# Patient Record
Sex: FEMALE | Race: BLACK OR AFRICAN AMERICAN | NOT HISPANIC OR LATINO | URBAN - METROPOLITAN AREA
[De-identification: names, ages, dates, MRNs, and addresses within clinical notes are randomized per-mention and may not be internally consistent; named-entity substitution may affect disease eponyms.]

---

## 2023-06-29 ENCOUNTER — APPOINTMENT (OUTPATIENT)
Dept: LAB | Facility: LAB | Age: 23
End: 2023-06-29
Payer: MEDICAID

## 2023-06-29 LAB
ABO GROUP (TYPE) IN BLOOD: NORMAL
ANTIBODY SCREEN: NORMAL
CHLAMYDIA TRACH., AMPLIFIED: NEGATIVE
CHLAMYDIA TRACH., AMPLIFIED: NORMAL
CLUE CELLS: NORMAL
ERYTHROCYTE DISTRIBUTION WIDTH (RATIO) BY AUTOMATED COUNT: 12.5 % (ref 11.5–14.5)
ERYTHROCYTE MEAN CORPUSCULAR HEMOGLOBIN CONCENTRATION (G/DL) BY AUTOMATED: 33.2 G/DL (ref 32–36)
ERYTHROCYTE MEAN CORPUSCULAR VOLUME (FL) BY AUTOMATED COUNT: 90 FL (ref 80–100)
ERYTHROCYTES (10*6/UL) IN BLOOD BY AUTOMATED COUNT: 4.63 X10E12/L (ref 4–5.2)
HEMATOCRIT (%) IN BLOOD BY AUTOMATED COUNT: 41.6 % (ref 36–46)
HEMOGLOBIN (G/DL) IN BLOOD: 13.8 G/DL (ref 12–16)
HEPATITIS B VIRUS SURFACE AG PRESENCE IN SERUM: NONREACTIVE
HEPATITIS C VIRUS AB PRESENCE IN SERUM: NONREACTIVE
HIV 1/ 2 AG/AB SCREEN: NONREACTIVE
LEUKOCYTES (10*3/UL) IN BLOOD BY AUTOMATED COUNT: 4.7 X10E9/L (ref 4.4–11.3)
N. GONORRHEA, AMPLIFIED: NEGATIVE
N. GONORRHEA, AMPLIFIED: NORMAL
NRBC (PER 100 WBCS) BY AUTOMATED COUNT: 0 /100 WBC (ref 0–0)
NUGENT SCORE: NORMAL
PLATELETS (10*3/UL) IN BLOOD AUTOMATED COUNT: 247 X10E9/L (ref 150–450)
REFLEX ADDED, ANEMIA PANEL: NORMAL
RH FACTOR: NORMAL
RUBELLA VIRUS IGG AB: POSITIVE
SYPHILIS TOTAL AB: NONREACTIVE
TRICHOMONAS VAGINALIS: NEGATIVE
TRICHOMONAS VAGINALIS: NORMAL
URINE CULTURE: NORMAL
YEAST: NORMAL

## 2023-06-30 LAB
CLUE CELLS: NORMAL
HEMOGLOBIN A2: 2.9 %
HEMOGLOBIN A: 96.6 %
HEMOGLOBIN F: 0.5 %
HEMOGLOBIN IDENTIFICATION INTERPRETATION: NORMAL
NUGENT SCORE: 0
PATH REVIEW-HGB IDENTIFICATION: NORMAL
URINE CULTURE: NORMAL
YEAST: NORMAL

## 2023-09-01 ENCOUNTER — LAB (OUTPATIENT)
Dept: LAB | Facility: LAB | Age: 23
End: 2023-09-01
Payer: MEDICAID

## 2023-09-01 LAB
ERYTHROCYTE DISTRIBUTION WIDTH (RATIO) BY AUTOMATED COUNT: 12.5 % (ref 11.5–14.5)
ERYTHROCYTE MEAN CORPUSCULAR HEMOGLOBIN CONCENTRATION (G/DL) BY AUTOMATED: 33.7 G/DL (ref 32–36)
ERYTHROCYTE MEAN CORPUSCULAR VOLUME (FL) BY AUTOMATED COUNT: 93 FL (ref 80–100)
ERYTHROCYTES (10*6/UL) IN BLOOD BY AUTOMATED COUNT: 4.01 X10E12/L (ref 4–5.2)
GLUCOSE, 1 HR SCREEN, PREG: 97 MG/DL
HEMATOCRIT (%) IN BLOOD BY AUTOMATED COUNT: 37.1 % (ref 36–46)
HEMOGLOBIN (G/DL) IN BLOOD: 12.5 G/DL (ref 12–16)
LEUKOCYTES (10*3/UL) IN BLOOD BY AUTOMATED COUNT: 7.8 X10E9/L (ref 4.4–11.3)
NRBC (PER 100 WBCS) BY AUTOMATED COUNT: 0 /100 WBC (ref 0–0)
PLATELETS (10*3/UL) IN BLOOD AUTOMATED COUNT: 248 X10E9/L (ref 150–450)
REFLEX ADDED, ANEMIA PANEL: NORMAL
SYPHILIS TOTAL AB: NONREACTIVE

## 2023-10-14 PROBLEM — F31.9 BIPOLAR DEPRESSION (MULTI): Status: ACTIVE | Noted: 2023-10-14

## 2023-10-14 PROBLEM — N89.8 VAGINAL IRRITATION: Status: ACTIVE | Noted: 2023-10-14

## 2023-10-14 PROBLEM — O35.9XX0 SUSPECTED FETAL ANOMALY, ANTEPARTUM (HHS-HCC): Status: ACTIVE | Noted: 2023-10-14

## 2023-10-14 PROBLEM — F31.9 BIPOLAR 1 DISORDER (MULTI): Status: ACTIVE | Noted: 2023-10-14

## 2023-10-14 PROBLEM — O09.32 INSUFFICIENT PRENATAL CARE IN SECOND TRIMESTER (HHS-HCC): Status: ACTIVE | Noted: 2023-10-14

## 2023-10-14 PROBLEM — O09.30 LATE PRENATAL CARE (HHS-HCC): Status: ACTIVE | Noted: 2023-10-14

## 2023-10-14 PROBLEM — L68.0 HIRSUTISM: Status: ACTIVE | Noted: 2023-10-14

## 2023-10-14 PROBLEM — Z34.00: Status: ACTIVE | Noted: 2023-10-14

## 2023-10-14 PROBLEM — Z34.80 SUPERVISION OF OTHER NORMAL PREGNANCY, ANTEPARTUM (HHS-HCC): Status: ACTIVE | Noted: 2023-10-14

## 2023-10-14 PROBLEM — B36.0 TINEA VERSICOLOR: Status: ACTIVE | Noted: 2023-10-14

## 2023-10-14 PROBLEM — F12.90 MARIJUANA USE: Status: ACTIVE | Noted: 2023-10-14

## 2023-10-14 PROBLEM — O35.9XX0 FETAL ABNORMALITY SUSPECTED, ANTEPARTUM CONDITION (HHS-HCC): Status: ACTIVE | Noted: 2023-10-14

## 2023-10-14 PROBLEM — Z34.80 PRENATAL CARE, SUBSEQUENT PREGNANCY (HHS-HCC): Status: ACTIVE | Noted: 2023-10-14

## 2023-10-14 PROBLEM — F33.2 SEVERE EPISODE OF RECURRENT MAJOR DEPRESSIVE DISORDER, WITHOUT PSYCHOTIC FEATURES (MULTI): Chronic | Status: ACTIVE | Noted: 2022-05-20

## 2023-10-14 PROBLEM — E66.3 OVERWEIGHT (BMI 25.0-29.9): Status: ACTIVE | Noted: 2023-10-14

## 2023-10-14 PROBLEM — Z82.49 FAMILY HISTORY OF CARDIAC ARREST: Status: ACTIVE | Noted: 2023-10-14

## 2023-10-14 PROBLEM — F12.10 MILD CANNABIS USE DISORDER: Status: ACTIVE | Noted: 2022-06-02

## 2023-10-14 PROBLEM — K52.9 GASTROENTERITIS: Status: ACTIVE | Noted: 2023-10-14

## 2023-10-14 PROBLEM — Z72.51 HIGH RISK SEXUAL BEHAVIOR: Status: ACTIVE | Noted: 2023-10-14

## 2023-10-14 PROBLEM — R55 VASOVAGAL EPISODE: Status: ACTIVE | Noted: 2019-02-08

## 2023-10-14 RX ORDER — SPIRONOLACTONE 100 MG/1
100 TABLET, FILM COATED ORAL DAILY
COMMUNITY
Start: 2022-10-18 | End: 2023-10-24 | Stop reason: HOSPADM

## 2023-10-14 RX ORDER — ONDANSETRON 4 MG/1
4 TABLET, ORALLY DISINTEGRATING ORAL EVERY 8 HOURS PRN
COMMUNITY
End: 2023-10-24 | Stop reason: HOSPADM

## 2023-10-14 RX ORDER — HYDROCORTISONE 25 MG/G
CREAM TOPICAL 2 TIMES DAILY
COMMUNITY
Start: 2017-04-06 | End: 2023-10-24 | Stop reason: HOSPADM

## 2023-10-14 RX ORDER — ETONOGESTREL 68 MG/1
IMPLANT SUBCUTANEOUS
COMMUNITY
End: 2023-10-24 | Stop reason: HOSPADM

## 2023-10-14 RX ORDER — VITAMINS AND MINERALS 1; 1000; 100; 400; 30; 3; 3; 15; 20; 12; 7; 200; 29; 20 MG/1; [IU]/1; MG/1; [IU]/1; [IU]/1; MG/1; MG/1; MG/1; MG/1; UG/1; MG/1; MG/1; MG/1; MG/1
1 TABLET, CHEWABLE ORAL DAILY
Status: ON HOLD | COMMUNITY
Start: 2023-07-19 | End: 2023-11-28 | Stop reason: WASHOUT

## 2023-10-14 RX ORDER — SERTRALINE HYDROCHLORIDE 50 MG/1
50 TABLET, FILM COATED ORAL DAILY
COMMUNITY
End: 2023-10-24 | Stop reason: HOSPADM

## 2023-10-14 RX ORDER — NORGESTREL AND ETHINYL ESTRADIOL 0.3-0.03MG
1 KIT ORAL DAILY
COMMUNITY
Start: 2023-03-14 | End: 2023-10-24 | Stop reason: HOSPADM

## 2023-10-14 RX ORDER — CLINDAMYCIN PHOSPHATE 10 UG/ML
LOTION TOPICAL
COMMUNITY
Start: 2018-11-02 | End: 2023-10-24 | Stop reason: HOSPADM

## 2023-10-14 RX ORDER — KETOCONAZOLE 20 MG/G
CREAM TOPICAL
COMMUNITY
Start: 2018-11-02 | End: 2023-10-24 | Stop reason: HOSPADM

## 2023-10-14 RX ORDER — OMEPRAZOLE 20 MG/1
20 CAPSULE, DELAYED RELEASE ORAL
COMMUNITY
Start: 2016-04-29 | End: 2023-10-24 | Stop reason: HOSPADM

## 2023-10-14 RX ORDER — FLUOXETINE HYDROCHLORIDE 20 MG/1
CAPSULE ORAL
COMMUNITY
Start: 2018-02-02 | End: 2023-10-24 | Stop reason: HOSPADM

## 2023-10-14 RX ORDER — ADAPALENE 1 MG/G
GEL TOPICAL
COMMUNITY
Start: 2018-11-02 | End: 2023-10-24 | Stop reason: HOSPADM

## 2023-10-14 RX ORDER — IBUPROFEN 600 MG/1
TABLET ORAL EVERY 6 HOURS
COMMUNITY
Start: 2019-09-11 | End: 2023-10-24 | Stop reason: HOSPADM

## 2023-10-14 RX ORDER — PNV,CALCIUM 72/IRON/FOLIC ACID 27 MG-1 MG
1 TABLET ORAL DAILY
COMMUNITY
Start: 2019-03-11 | End: 2023-10-24 | Stop reason: HOSPADM

## 2023-10-14 RX ORDER — DOCUSATE SODIUM 100 MG/1
100 CAPSULE, LIQUID FILLED ORAL 2 TIMES DAILY PRN
COMMUNITY
Start: 2019-09-11 | End: 2023-10-24 | Stop reason: HOSPADM

## 2023-10-14 RX ORDER — HYDROXYZINE PAMOATE 25 MG/1
25 CAPSULE ORAL 3 TIMES DAILY PRN
COMMUNITY
Start: 2022-06-03 | End: 2023-10-24 | Stop reason: HOSPADM

## 2023-10-23 ENCOUNTER — TELEPHONE (OUTPATIENT)
Dept: OBSTETRICS AND GYNECOLOGY | Facility: HOSPITAL | Age: 23
End: 2023-10-23
Payer: MEDICAID

## 2023-10-23 NOTE — TELEPHONE ENCOUNTER
Discuss symptoms TODD 12.6.23    Called patient back to discuss concerns  Patient 33.6 wga  States she had some light spotting yesterday that has now stopped  Denies heavy vaginal bleeding, loss of fluid and decreased fetal movement  Denies recent sexual activity  Patient also endorses having headaches that are not always resolved with tylenol  Denies vision changes, swelling, abdominal pain  States she has pelvic pain  Instructed patient to present to triage for evaluation of headaches and spotting  Patient verbalized understanding  Encouraged patient to call office with any other questions or concerns  Riri Hightower RN

## 2023-10-24 ENCOUNTER — HOSPITAL ENCOUNTER (OUTPATIENT)
Facility: HOSPITAL | Age: 23
Discharge: HOME | End: 2023-10-24
Attending: STUDENT IN AN ORGANIZED HEALTH CARE EDUCATION/TRAINING PROGRAM | Admitting: STUDENT IN AN ORGANIZED HEALTH CARE EDUCATION/TRAINING PROGRAM
Payer: MEDICAID

## 2023-10-24 VITALS
SYSTOLIC BLOOD PRESSURE: 120 MMHG | RESPIRATION RATE: 18 BRPM | HEART RATE: 90 BPM | TEMPERATURE: 97.7 F | OXYGEN SATURATION: 94 % | DIASTOLIC BLOOD PRESSURE: 65 MMHG

## 2023-10-24 PROBLEM — Z91.51 HISTORY OF SUICIDE ATTEMPT: Status: ACTIVE | Noted: 2023-10-24

## 2023-10-24 PROBLEM — Z87.59 HISTORY OF POSTPARTUM DEPRESSION: Status: ACTIVE | Noted: 2023-10-24

## 2023-10-24 PROBLEM — Z86.59 HISTORY OF POSTPARTUM DEPRESSION: Status: ACTIVE | Noted: 2023-10-24

## 2023-10-24 LAB
ALBUMIN SERPL BCP-MCNC: 3.8 G/DL (ref 3.4–5)
ALP SERPL-CCNC: 131 U/L (ref 33–110)
ALT SERPL W P-5'-P-CCNC: 8 U/L (ref 7–45)
ANION GAP SERPL CALC-SCNC: 16 MMOL/L (ref 10–20)
AST SERPL W P-5'-P-CCNC: 17 U/L (ref 9–39)
BILIRUB SERPL-MCNC: 0.3 MG/DL (ref 0–1.2)
BUN SERPL-MCNC: 6 MG/DL (ref 6–23)
CALCIUM SERPL-MCNC: 9.3 MG/DL (ref 8.6–10.6)
CHLORIDE SERPL-SCNC: 102 MMOL/L (ref 98–107)
CLUE CELLS SPEC QL WET PREP: NORMAL
CO2 SERPL-SCNC: 21 MMOL/L (ref 21–32)
CREAT SERPL-MCNC: 0.48 MG/DL (ref 0.5–1.05)
CREAT UR-MCNC: 19.7 MG/DL (ref 20–320)
ERYTHROCYTE [DISTWIDTH] IN BLOOD BY AUTOMATED COUNT: 12 % (ref 11.5–14.5)
GFR SERPL CREATININE-BSD FRML MDRD: >90 ML/MIN/1.73M*2
GLUCOSE SERPL-MCNC: 103 MG/DL (ref 74–99)
HCT VFR BLD AUTO: 38.2 % (ref 36–46)
HGB BLD-MCNC: 12.5 G/DL (ref 12–16)
LDH SERPL L TO P-CCNC: 225 U/L (ref 84–246)
MCH RBC QN AUTO: 30.3 PG (ref 26–34)
MCHC RBC AUTO-ENTMCNC: 32.7 G/DL (ref 32–36)
MCV RBC AUTO: 93 FL (ref 80–100)
NRBC BLD-RTO: 0 /100 WBCS (ref 0–0)
PLATELET # BLD AUTO: 255 X10*3/UL (ref 150–450)
PMV BLD AUTO: 11.8 FL (ref 7.5–11.5)
POTASSIUM SERPL-SCNC: 4 MMOL/L (ref 3.5–5.3)
PROT SERPL-MCNC: 6.9 G/DL (ref 6.4–8.2)
PROT UR-ACNC: 4 MG/DL (ref 5–24)
PROT/CREAT UR: 0.2 MG/MG CREAT (ref 0–0.17)
RBC # BLD AUTO: 4.12 X10*6/UL (ref 4–5.2)
SODIUM SERPL-SCNC: 135 MMOL/L (ref 136–145)
T VAGINALIS SPEC QL WET PREP: NORMAL
WBC # BLD AUTO: 12.2 X10*3/UL (ref 4.4–11.3)
WBC VAG QL WET PREP: NORMAL
YEAST VAG QL WET PREP: NORMAL

## 2023-10-24 PROCEDURE — 36415 COLL VENOUS BLD VENIPUNCTURE: CPT | Mod: 59

## 2023-10-24 PROCEDURE — 99215 OFFICE O/P EST HI 40 MIN: CPT

## 2023-10-24 PROCEDURE — 82570 ASSAY OF URINE CREATININE: CPT | Performed by: NURSE PRACTITIONER

## 2023-10-24 PROCEDURE — 83615 LACTATE (LD) (LDH) ENZYME: CPT | Performed by: NURSE PRACTITIONER

## 2023-10-24 PROCEDURE — 2500000004 HC RX 250 GENERAL PHARMACY W/ HCPCS (ALT 636 FOR OP/ED): Performed by: NURSE PRACTITIONER

## 2023-10-24 PROCEDURE — 99214 OFFICE O/P EST MOD 30 MIN: CPT | Performed by: NURSE PRACTITIONER

## 2023-10-24 PROCEDURE — 2500000001 HC RX 250 WO HCPCS SELF ADMINISTERED DRUGS (ALT 637 FOR MEDICARE OP): Performed by: NURSE PRACTITIONER

## 2023-10-24 PROCEDURE — 87210 SMEAR WET MOUNT SALINE/INK: CPT | Performed by: NURSE PRACTITIONER

## 2023-10-24 PROCEDURE — 36415 COLL VENOUS BLD VENIPUNCTURE: CPT | Performed by: NURSE PRACTITIONER

## 2023-10-24 PROCEDURE — 85027 COMPLETE CBC AUTOMATED: CPT | Performed by: NURSE PRACTITIONER

## 2023-10-24 PROCEDURE — 80053 COMPREHEN METABOLIC PANEL: CPT | Performed by: NURSE PRACTITIONER

## 2023-10-24 RX ORDER — ACETAMINOPHEN 325 MG/1
975 TABLET ORAL ONCE
Status: COMPLETED | OUTPATIENT
Start: 2023-10-24 | End: 2023-10-24

## 2023-10-24 RX ORDER — CYPROHEPTADINE HYDROCHLORIDE 4 MG/1
4 TABLET ORAL ONCE
Status: DISCONTINUED | OUTPATIENT
Start: 2023-10-24 | End: 2023-10-24 | Stop reason: HOSPADM

## 2023-10-24 RX ORDER — NIFEDIPINE 10 MG/1
10 CAPSULE ORAL ONCE AS NEEDED
Status: DISCONTINUED | OUTPATIENT
Start: 2023-10-24 | End: 2023-10-24 | Stop reason: HOSPADM

## 2023-10-24 RX ORDER — LABETALOL HYDROCHLORIDE 5 MG/ML
20 INJECTION, SOLUTION INTRAVENOUS ONCE AS NEEDED
Status: DISCONTINUED | OUTPATIENT
Start: 2023-10-24 | End: 2023-10-24 | Stop reason: HOSPADM

## 2023-10-24 RX ORDER — HYDRALAZINE HYDROCHLORIDE 20 MG/ML
5 INJECTION INTRAMUSCULAR; INTRAVENOUS ONCE AS NEEDED
Status: DISCONTINUED | OUTPATIENT
Start: 2023-10-24 | End: 2023-10-24 | Stop reason: HOSPADM

## 2023-10-24 RX ORDER — DIPHENHYDRAMINE HCL 25 MG
25 CAPSULE ORAL ONCE
Status: COMPLETED | OUTPATIENT
Start: 2023-10-24 | End: 2023-10-24

## 2023-10-24 RX ORDER — METOCLOPRAMIDE 10 MG/1
10 TABLET ORAL ONCE
Status: COMPLETED | OUTPATIENT
Start: 2023-10-24 | End: 2023-10-24

## 2023-10-24 RX ORDER — LANOLIN ALCOHOL/MO/W.PET/CERES
400 CREAM (GRAM) TOPICAL ONCE
Status: COMPLETED | OUTPATIENT
Start: 2023-10-24 | End: 2023-10-24

## 2023-10-24 RX ADMIN — METOCLOPRAMIDE 10 MG: 10 TABLET ORAL at 16:29

## 2023-10-24 RX ADMIN — ACETAMINOPHEN 975 MG: 325 TABLET ORAL at 16:29

## 2023-10-24 RX ADMIN — Medication 400 MG: at 18:15

## 2023-10-24 RX ADMIN — DIPHENHYDRAMINE HYDROCHLORIDE 25 MG: 25 CAPSULE ORAL at 16:29

## 2023-10-24 SDOH — ECONOMIC STABILITY: HOUSING INSECURITY: DO YOU FEEL UNSAFE GOING BACK TO THE PLACE WHERE YOU ARE LIVING?: NO

## 2023-10-24 SDOH — SOCIAL STABILITY: SOCIAL INSECURITY: VERBAL ABUSE: DENIES

## 2023-10-24 SDOH — HEALTH STABILITY: MENTAL HEALTH: HAVE YOU USED ANY SUBSTANCES (CANABIS, COCAINE, HEROIN, HALLUCINOGENS, INHALANTS, ETC.) IN THE PAST 12 MONTHS?: YES

## 2023-10-24 SDOH — HEALTH STABILITY: MENTAL HEALTH: HAVE YOU USED ANY PRESCRIPTION DRUGS OTHER THAN PRESCRIBED IN THE PAST 12 MONTHS?: NO

## 2023-10-24 SDOH — HEALTH STABILITY: MENTAL HEALTH: WERE YOU ABLE TO COMPLETE ALL THE BEHAVIORAL HEALTH SCREENINGS?: YES

## 2023-10-24 SDOH — SOCIAL STABILITY: SOCIAL INSECURITY: ARE THERE ANY APPARENT SIGNS OF INJURIES/BEHAVIORS THAT COULD BE RELATED TO ABUSE/NEGLECT?: NO

## 2023-10-24 SDOH — SOCIAL STABILITY: SOCIAL INSECURITY: DO YOU FEEL ANYONE HAS EXPLOITED OR TAKEN ADVANTAGE OF YOU FINANCIALLY OR OF YOUR PERSONAL PROPERTY?: NO

## 2023-10-24 SDOH — HEALTH STABILITY: MENTAL HEALTH: SUICIDAL BEHAVIOR (LIFETIME): YES

## 2023-10-24 SDOH — HEALTH STABILITY: MENTAL HEALTH: NON-SPECIFIC ACTIVE SUICIDAL THOUGHTS (PAST 1 MONTH): NO

## 2023-10-24 SDOH — SOCIAL STABILITY: SOCIAL INSECURITY: ABUSE SCREEN: ADULT

## 2023-10-24 SDOH — SOCIAL STABILITY: SOCIAL INSECURITY: HAS ANYONE EVER THREATENED TO HURT YOUR FAMILY OR YOUR PETS?: NO

## 2023-10-24 SDOH — SOCIAL STABILITY: SOCIAL INSECURITY: ARE YOU OR HAVE YOU BEEN THREATENED OR ABUSED PHYSICALLY, EMOTIONALLY, OR SEXUALLY BY ANYONE?: NO

## 2023-10-24 SDOH — HEALTH STABILITY: MENTAL HEALTH: WISH TO BE DEAD (PAST 1 MONTH): NO

## 2023-10-24 SDOH — SOCIAL STABILITY: SOCIAL INSECURITY: PHYSICAL ABUSE: DENIES

## 2023-10-24 SDOH — HEALTH STABILITY: MENTAL HEALTH: SUICIDAL BEHAVIOR (3 MONTHS): NO

## 2023-10-24 SDOH — SOCIAL STABILITY: SOCIAL INSECURITY: DOES ANYONE TRY TO KEEP YOU FROM HAVING/CONTACTING OTHER FRIENDS OR DOING THINGS OUTSIDE YOUR HOME?: NO

## 2023-10-24 SDOH — SOCIAL STABILITY: SOCIAL INSECURITY: HAVE YOU HAD THOUGHTS OF HARMING ANYONE ELSE?: NO

## 2023-10-24 ASSESSMENT — PAIN SCALES - GENERAL
PAINLEVEL_OUTOF10: 6
PAINLEVEL_OUTOF10: 4
PAINLEVEL_OUTOF10: 6
PAINLEVEL: 6

## 2023-10-24 ASSESSMENT — LIFESTYLE VARIABLES
HOW OFTEN DO YOU HAVE A DRINK CONTAINING ALCOHOL: NEVER
HOW MANY STANDARD DRINKS CONTAINING ALCOHOL DO YOU HAVE ON A TYPICAL DAY: PATIENT DOES NOT DRINK
AUDIT-C TOTAL SCORE: 0
SKIP TO QUESTIONS 9-10: 1
HOW OFTEN DO YOU HAVE 6 OR MORE DRINKS ON ONE OCCASION: NEVER
AUDIT-C TOTAL SCORE: 0

## 2023-10-24 ASSESSMENT — PATIENT HEALTH QUESTIONNAIRE - PHQ9
SUM OF ALL RESPONSES TO PHQ9 QUESTIONS 1 & 2: 0
2. FEELING DOWN, DEPRESSED OR HOPELESS: NOT AT ALL
1. LITTLE INTEREST OR PLEASURE IN DOING THINGS: NOT AT ALL

## 2023-10-24 NOTE — H&P
Obstetrical Admission History and Physical     Byron Luis is a 23 y.o.  at 33.6 wga by LMP c/w 19.0 wk US.     Chief Complaint: Headache (6/10; Pelvic pain, light spotting)    Assessment/Plan    Headache  - 6/10 frontal HA  - afebrile, no sick s/s  - mild frontal sinus tenderness with palpation  - tylenol, reglan, benadryl, PO hydration given with good resolution of HA initially but the HA returned  - given periactin and Mg ox --> decreased to 3/10  - Bps cycled in triage and grossly normotensive, no other PEC s/s  - HELLP labs neg, P:C 0.20  - discussed PEC s/s, warning signs, when to return    Spotting  - Rh positive, no indication for Rhogam  - SSE mod amt gray/white discharge, no blood in vault or from cervix  - SVE closed/long/high  - wet prep neg  - no c/f PTL/abruption based on PE    Pelvic Pain  - suspect pubic symphysis pain based on PE    Maternal Well-being  - Vital signs stable and WNL  - Emotional support and reassurance provided  - All questions and concerns addressed    Fetal Well-being  - , RNST  - good FM per pt    Dispo: Home with precautions. Keep appt as scheduled on   Plan and tracing discussed with Dr. Jordan who reviewed tracing and agrees with d/c home    LAZARA Block      Pregnancy Problems (from 10/24/23 to present)       Problem Noted Resolved    History of postpartum depression 10/24/2023 by LAZARA Block No    Priority:  Medium      History of suicide attempt 10/24/2023 by LAZARA Block No    Priority:  Medium  - attempted to jump out of a car on highway postpartum      Bipolar 1 disorder (CMS/HCC) 10/14/2023 by ERMA Rajan No    Priority:  Medium  - previously on Zoloft, no meds currently, counseling, mom support group      Late prenatal care 10/14/2023 by Nelia Maddox No    Priority:  Medium      Mild cannabis use disorder 2022 by Nelia Maddox No    Priority:  Medium  - on 10/24, states last use two  "weeks ago            Subjective   Byron is here with one week h/o frontal headache unrelieved by Tylenol. She denies scotoma, RUQ pain, chest pain, SOB, fever, chills, myalgias, congestion, sick contacts. Additionally she reports one episode of spotting with wiping yesterday and pelvic pressure and pain into both groins with crossing legs. She does report vaginal odor and itching and denies concern for STIs. Last intercourse two weeks ago.  She denies LOF, irritation, and reports good FM.    Of note: pt has h/o bipolar disorder and is mod risk on screening. She reports stable mood, good support, participates in \"mom\" support group and does counseling. Last appt one month ago. She denies any SI/HI at this time.      Obstetrical History   OB History    Para Term  AB Living   2 1 1         SAB IAB Ectopic Multiple Live Births                  # Outcome Date GA Lbr Valeriy/2nd Weight Sex Delivery Anes PTL Lv   2 Current            1 Term 2019   3714 g M Vag-Spont          Past Medical History  Past Medical History:   Diagnosis Date    Bipolar 1 disorder (CMS/formerly [facility])         Past Surgical History   No past surgical history on file.    Social History  Social History     Tobacco Use    Smoking status: Not on file    Smokeless tobacco: Not on file   Substance Use Topics    Alcohol use: Not on file     Substance and Sexual Activity   Drug Use Not on file       Allergies  Red dye     Medications  Medication list reviewed and updated 10/24/23.  Pt reports only PNV but not taking daily.    No current facility-administered medications on file prior to encounter.     Current Outpatient Medications on File Prior to Encounter   Medication Sig Dispense Refill    adapalene (Differin) 0.1 % gel Differin 0.1 % External Gel   Quantity: 45  Refills: 0        Start : 2018   Active      clindamycin (Cleocin T) 1 % lotion Clindamycin Phosphate 1 % External Lotion   Quantity: 60  Refills: 0        Start : 2018   Active   "    [place], 28, 0.3-30 mg-mcg tablet Take 1 tablet by mouth once daily.      docusate sodium (Colace) 100 mg capsule Take 1 capsule (100 mg) by mouth 2 times a day as needed for constipation.      etonogestrel-eluting contraceptive (Nexplanon) 68 mg implant implant Inject under the skin.      FLUoxetine (PROzac) 20 mg capsule Take by mouth.      hydrocortisone 2.5 % cream 2 times a day.  Apply to affected skin      hydrOXYzine pamoate (Vistaril) 25 mg capsule Take 1 capsule (25 mg) by mouth 3 times a day as needed for anxiety.      ibuprofen 600 mg tablet Take by mouth every 6 hours.      ketoconazole (NIZOral) 2 % cream Ketoconazole 2 % External Cream   Refills: 0        Start : 2018   Active      NON FORMULARY Take 1 each by mouth once daily. CitraNatal 90 DHA 90-1 & 300 MG      omeprazole (PriLOSEC) 20 mg DR capsule Take 1 capsule (20 mg) by mouth once daily in the morning. Take before meals.      ondansetron ODT (Zofran-ODT) 4 mg disintegrating tablet 1 tablet (4 mg) every 8 hours if needed for nausea.      prenatal vitamin calcium-iron-folic (PrePlus) 27 mg iron- 1 mg tablet Take 1 tablet by mouth once daily.      Se- 19 Chewable 29 mg iron- 1 mg tablet,chewable Chew 1 tablet once daily.      sertraline (Zoloft) 50 mg tablet Take 1 tablet (50 mg) by mouth once daily.      spironolactone (Aldactone) 100 mg tablet Take 1 tablet (100 mg) by mouth once daily.         Objective    Last Vitals  Temp Pulse Resp BP MAP O2 Sat   36.5 °C (97.7 °F) 90 18 120/65   (!) 94 %  98% just prior to this reading, pt without resp symptoms, suspect inaccurate reading     Physical Examination  GENERAL: Examination reveals a well developed, well nourished, gravid female in no acute distress. She is alert and cooperative.  ABDOMEN: soft, gravid, nontender, nondistended, no abnormal masses, no epigastric pain  FHR is 140 , with mod variability, + accels, no decels  [place] reading:  occasional CTXs  VAGINA: abnormal discharge  with white and thin discharge  fluid, no blood in vault  CERVIX: closed/long/high; MEMBRANES are Intactintact  NEUROLOGICAL: alert, oriented, normal speech, no focal findings or movement disorder noted  PSYCHOLOGICAL: awake and alert; oriented to person, place, and time    Lab Review  Admission on 10/24/2023   Component Date Value Ref Range Status    Trichomonas 10/24/2023 None Seen  None Seen Final    Clue Cells 10/24/2023 None Seen  None Seen Final    Yeast 10/24/2023 None Seen  None Seen Final    WBC 10/24/2023 3-8   Final    WBC 10/24/2023 12.2 (H)  4.4 - 11.3 x10*3/uL Final    nRBC 10/24/2023 0.0  0.0 - 0.0 /100 WBCs Final    RBC 10/24/2023 4.12  4.00 - 5.20 x10*6/uL Final    Hemoglobin 10/24/2023 12.5  12.0 - 16.0 g/dL Final    Hematocrit 10/24/2023 38.2  36.0 - 46.0 % Final    MCV 10/24/2023 93  80 - 100 fL Final    MCH 10/24/2023 30.3  26.0 - 34.0 pg Final    MCHC 10/24/2023 32.7  32.0 - 36.0 g/dL Final    RDW 10/24/2023 12.0  11.5 - 14.5 % Final    Platelets 10/24/2023 255  150 - 450 x10*3/uL Final    MPV 10/24/2023 11.8 (H)  7.5 - 11.5 fL Final    Glucose 10/24/2023 103 (H)  74 - 99 mg/dL Final    Sodium 10/24/2023 135 (L)  136 - 145 mmol/L Final    Potassium 10/24/2023 4.0  3.5 - 5.3 mmol/L Final    MILD HEMOLYSIS DETECTED. The result may be falsely elevated due to hemolysis or other interferents. Clinical correlation is recommended. Repeat testing may be considered.    Chloride 10/24/2023 102  98 - 107 mmol/L Final    Bicarbonate 10/24/2023 21  21 - 32 mmol/L Final    Anion Gap 10/24/2023 16  10 - 20 mmol/L Final    Urea Nitrogen 10/24/2023 6  6 - 23 mg/dL Final    Creatinine 10/24/2023 0.48 (L)  0.50 - 1.05 mg/dL Final    eGFR 10/24/2023 >90  >60 mL/min/1.73m*2 Final    Calculations of estimated GFR are performed using the 2021 CKD-EPI Study Refit equation without the race variable for the IDMS-Traceable creatinine methods.  https://jasn.asnjournals.org/content/early/2021/09/22/ASN.4523714728    Calcium  10/24/2023 9.3  8.6 - 10.6 mg/dL Final    Albumin 10/24/2023 3.8  3.4 - 5.0 g/dL Final    Alkaline Phosphatase 10/24/2023 131 (H)  33 - 110 U/L Final    Total Protein 10/24/2023 6.9  6.4 - 8.2 g/dL Final    AST 10/24/2023 17  9 - 39 U/L Final    MILD HEMOLYSIS DETECTED. The result may be falsely elevated due to hemolysis or other interferents. Clinical correlation is recommended. Repeat testing may be considered.    Bilirubin, Total 10/24/2023 0.3  0.0 - 1.2 mg/dL Final    ALT 10/24/2023 8  7 - 45 U/L Final    Patients treated with Sulfasalazine may generate falsely decreased results for ALT.    LDH 10/24/2023 225  84 - 246 U/L Final    MILD HEMOLYSIS DETECTED. The result may be falsely elevated due to hemolysis or other interferents. Clinical correlation is recommended. Repeat testing may be considered.

## 2023-10-25 ENCOUNTER — HOSPITAL ENCOUNTER (OUTPATIENT)
Facility: HOSPITAL | Age: 23
Discharge: HOME | End: 2023-10-25
Attending: STUDENT IN AN ORGANIZED HEALTH CARE EDUCATION/TRAINING PROGRAM | Admitting: STUDENT IN AN ORGANIZED HEALTH CARE EDUCATION/TRAINING PROGRAM
Payer: MEDICAID

## 2023-10-25 ENCOUNTER — HOSPITAL ENCOUNTER (OUTPATIENT)
Facility: HOSPITAL | Age: 23
End: 2023-10-25
Attending: STUDENT IN AN ORGANIZED HEALTH CARE EDUCATION/TRAINING PROGRAM | Admitting: STUDENT IN AN ORGANIZED HEALTH CARE EDUCATION/TRAINING PROGRAM
Payer: MEDICAID

## 2023-10-25 VITALS
WEIGHT: 211.64 LBS | HEART RATE: 84 BPM | TEMPERATURE: 97.9 F | SYSTOLIC BLOOD PRESSURE: 119 MMHG | HEIGHT: 66 IN | RESPIRATION RATE: 18 BRPM | BODY MASS INDEX: 34.01 KG/M2 | DIASTOLIC BLOOD PRESSURE: 65 MMHG

## 2023-10-25 LAB
POC APPEARANCE, URINE: CLEAR
POC BILIRUBIN, URINE: NEGATIVE
POC BLOOD, URINE: NEGATIVE
POC COLOR, URINE: YELLOW
POC GLUCOSE, URINE: NEGATIVE MG/DL
POC KETONES, URINE: NEGATIVE MG/DL
POC LEUKOCYTES, URINE: NEGATIVE
POC NITRITE,URINE: NEGATIVE
POC PH, URINE: 7 PH
POC PROTEIN, URINE: NEGATIVE MG/DL
POC SPECIFIC GRAVITY, URINE: >=1.03
POC UROBILINOGEN, URINE: 0.2 EU/DL

## 2023-10-25 PROCEDURE — 59025 FETAL NON-STRESS TEST: CPT

## 2023-10-25 PROCEDURE — 99214 OFFICE O/P EST MOD 30 MIN: CPT | Mod: 25

## 2023-10-25 PROCEDURE — 99213 OFFICE O/P EST LOW 20 MIN: CPT

## 2023-10-25 PROCEDURE — 59025 FETAL NON-STRESS TEST: CPT | Mod: GC

## 2023-10-25 RX ORDER — ONDANSETRON 4 MG/1
4 TABLET, FILM COATED ORAL EVERY 6 HOURS PRN
Status: DISCONTINUED | OUTPATIENT
Start: 2023-10-25 | End: 2023-10-25 | Stop reason: HOSPADM

## 2023-10-25 RX ORDER — ONDANSETRON HYDROCHLORIDE 2 MG/ML
4 INJECTION, SOLUTION INTRAVENOUS EVERY 6 HOURS PRN
Status: DISCONTINUED | OUTPATIENT
Start: 2023-10-25 | End: 2023-10-25 | Stop reason: HOSPADM

## 2023-10-25 RX ORDER — LABETALOL HYDROCHLORIDE 5 MG/ML
20 INJECTION, SOLUTION INTRAVENOUS ONCE AS NEEDED
Status: DISCONTINUED | OUTPATIENT
Start: 2023-10-25 | End: 2023-10-25 | Stop reason: HOSPADM

## 2023-10-25 RX ORDER — LIDOCAINE HYDROCHLORIDE 10 MG/ML
0.5 INJECTION INFILTRATION; PERINEURAL ONCE AS NEEDED
Status: DISCONTINUED | OUTPATIENT
Start: 2023-10-25 | End: 2023-10-25 | Stop reason: HOSPADM

## 2023-10-25 RX ORDER — NIFEDIPINE 10 MG/1
10 CAPSULE ORAL ONCE AS NEEDED
Status: DISCONTINUED | OUTPATIENT
Start: 2023-10-25 | End: 2023-10-25 | Stop reason: HOSPADM

## 2023-10-25 RX ORDER — HYDRALAZINE HYDROCHLORIDE 20 MG/ML
5 INJECTION INTRAMUSCULAR; INTRAVENOUS ONCE AS NEEDED
Status: DISCONTINUED | OUTPATIENT
Start: 2023-10-25 | End: 2023-10-25 | Stop reason: HOSPADM

## 2023-10-25 ASSESSMENT — PAIN SCALES - GENERAL: PAINLEVEL: 0 - NO PAIN

## 2023-10-25 NOTE — H&P
Obstetrical Admission History and Physical- OB Triage     Byron Luis is a 22yo  at 34.0wga presenting to OB triage for vaginal bleeding.     Patient reports one episode of vaginal bleeding after having a bowel movement today. She states that after defecating, she got up and found blood in the toilet. States the quantity was similar in amount to a period. She denies straining with defecation. She additionally denies urinary sx including dysuria or known hematuria. She had no additional bleeding with wiping or continued bleeding following that episode. She otherwise denies ctx or LOF. Reports good FM.     OBhx:  x1   GYNhx: Pap - NILM   PMH: bipolar depression, PPD  PSH: denies   Meds: PNV  Social: MJ use             Obstetrical History   OB History    Para Term  AB Living   2 1 1         SAB IAB Ectopic Multiple Live Births                  # Outcome Date GA Lbr Valeriy/2nd Weight Sex Delivery Anes PTL Lv   2 Current            1 Term 2019   3714 g M Vag-Spont          Past Medical History  Past Medical History:   Diagnosis Date    Bipolar 1 disorder (CMS/[facility])         Past Surgical History   No past surgical history on file.    Social History  Social History     Tobacco Use    Smoking status: Not on file    Smokeless tobacco: Not on file   Substance Use Topics    Alcohol use: Not on file     Substance and Sexual Activity   Drug Use Not on file       Allergies  Red dye     Medications  Medications Prior to Admission   Medication Sig Dispense Refill Last Dose    Se- 19 Chewable 29 mg iron- 1 mg tablet,chewable Chew 1 tablet once daily.          Objective    Last Vitals  Temp Pulse Resp BP MAP O2 Sat   36.6 °C (97.9 °F) 83 18 124/60         Physical Examination  Genitourinary:      Vulva normal.   Cardiovascular:      Rate and Rhythm: Normal rate.   Pulmonary:      Effort: Pulmonary effort is normal.      Breath sounds: Normal breath sounds.   Abdominal:      Palpations: Abdomen is  soft.      Comments: Gravid, non-tender to palpation  Neurological:      General: No focal deficit present.      Mental Status: She is alert.   Skin:     General: Skin is warm and dry.   Psychiatric:         Mood and Affect: Mood normal.         Behavior: Behavior normal.       NST  Baseline: 140  Variability: moderate  Accels: +  Decels: -   [place]: quiet    SVE: 0/0/-3    SSE: No blood in vault or from cervix     Lab Review  Labs in chart were reviewed.    Assessment and Plan   Byron Luis is a 24yo  at 34.0wga presenting to OB triage for vaginal bleeding.     1.Vaginal Bleeding   - Patient previously seen in OB triage today for HA and vaginal spotting. Underwent reassuring workup with a negative wet prep, closed cervix on SVE, and no blood on SSE. Has since had an additional episode of vaginal bleeding follow defecation this morning  - Rh positive; no indication for Rhogam   - SSE with no blood in vault; none noted to vagina or vulva   - SVE: 0/0/-3  - BSUS with gross bodily movements; cephalic, anterior placenta visualized   - Low concern for PTL or abruption at this time   - Suspect cervical irritation from recent cervical exam vs rectal bleeding 2/2 internal hemorrhoids   - Encouraged stool softeners, increased fiber intake   - Strict return precautions given     2. IUP at 34.0wga  - NST reactive   - Denies Ctx or LOF; good FM   - SVE, as above  - Reassurance provided  - Continue routine prenatal care    Dispo: home in stable condition with strict return precautions. Patient agreeable with plan.     Pt. Seen & dw Dr. Nikki Mckeon MD  PGY-1, Obstetrics & Gynecology   [facility]

## 2023-11-09 ENCOUNTER — OFFICE VISIT (OUTPATIENT)
Dept: OBSTETRICS AND GYNECOLOGY | Facility: HOSPITAL | Age: 23
End: 2023-11-09
Payer: MEDICAID

## 2023-11-09 VITALS — SYSTOLIC BLOOD PRESSURE: 129 MMHG | WEIGHT: 215 LBS | DIASTOLIC BLOOD PRESSURE: 81 MMHG | BODY MASS INDEX: 34.7 KG/M2

## 2023-11-09 DIAGNOSIS — O09.30 LATE PRENATAL CARE (HHS-HCC): ICD-10-CM

## 2023-11-09 DIAGNOSIS — Z86.59 HISTORY OF POSTPARTUM DEPRESSION: ICD-10-CM

## 2023-11-09 DIAGNOSIS — O09.33 LIMITED PRENATAL CARE IN THIRD TRIMESTER (HHS-HCC): ICD-10-CM

## 2023-11-09 DIAGNOSIS — O26.843 UTERINE SIZE-DATE DISCREPANCY IN THIRD TRIMESTER (HHS-HCC): ICD-10-CM

## 2023-11-09 DIAGNOSIS — Z34.80 SUPERVISION OF OTHER NORMAL PREGNANCY, ANTEPARTUM (HHS-HCC): ICD-10-CM

## 2023-11-09 DIAGNOSIS — Z91.51 HISTORY OF SUICIDE ATTEMPT: ICD-10-CM

## 2023-11-09 DIAGNOSIS — Z3A.36 36 WEEKS GESTATION OF PREGNANCY (HHS-HCC): Primary | ICD-10-CM

## 2023-11-09 DIAGNOSIS — F31.9 BIPOLAR 1 DISORDER (MULTI): ICD-10-CM

## 2023-11-09 DIAGNOSIS — F12.91 HISTORY OF MARIJUANA USE: ICD-10-CM

## 2023-11-09 DIAGNOSIS — F12.10 MILD CANNABIS USE DISORDER: ICD-10-CM

## 2023-11-09 DIAGNOSIS — Z23 NEED FOR TDAP VACCINATION: ICD-10-CM

## 2023-11-09 DIAGNOSIS — Z87.59 HISTORY OF POSTPARTUM DEPRESSION: ICD-10-CM

## 2023-11-09 DIAGNOSIS — N89.8 VAGINAL PRURITUS: ICD-10-CM

## 2023-11-09 DIAGNOSIS — B37.89 CANDIDIASIS OF BREAST: ICD-10-CM

## 2023-11-09 PROBLEM — E66.3 OVERWEIGHT (BMI 25.0-29.9): Status: RESOLVED | Noted: 2023-10-14 | Resolved: 2023-11-09

## 2023-11-09 PROBLEM — F12.90 MARIJUANA USE: Status: RESOLVED | Noted: 2023-10-14 | Resolved: 2023-11-09

## 2023-11-09 PROBLEM — R55 VASOVAGAL EPISODE: Status: RESOLVED | Noted: 2019-02-08 | Resolved: 2023-11-09

## 2023-11-09 LAB
AMPHETAMINES UR QL SCN: NORMAL
BARBITURATES UR QL SCN: NORMAL
BENZODIAZ UR QL SCN: NORMAL
BZE UR QL SCN: NORMAL
CANNABINOIDS UR QL SCN: NORMAL
FENTANYL+NORFENTANYL UR QL SCN: NORMAL
OPIATES UR QL SCN: NORMAL
OXYCODONE+OXYMORPHONE UR QL SCN: NORMAL
PCP UR QL SCN: NORMAL

## 2023-11-09 PROCEDURE — 90471 IMMUNIZATION ADMIN: CPT | Performed by: ADVANCED PRACTICE MIDWIFE

## 2023-11-09 PROCEDURE — 87661 TRICHOMONAS VAGINALIS AMPLIF: CPT | Mod: 91 | Performed by: ADVANCED PRACTICE MIDWIFE

## 2023-11-09 PROCEDURE — 87081 CULTURE SCREEN ONLY: CPT | Performed by: ADVANCED PRACTICE MIDWIFE

## 2023-11-09 PROCEDURE — 99213 OFFICE O/P EST LOW 20 MIN: CPT | Mod: 25 | Performed by: ADVANCED PRACTICE MIDWIFE

## 2023-11-09 PROCEDURE — 1036F TOBACCO NON-USER: CPT | Performed by: ADVANCED PRACTICE MIDWIFE

## 2023-11-09 PROCEDURE — 80307 DRUG TEST PRSMV CHEM ANLYZR: CPT | Performed by: ADVANCED PRACTICE MIDWIFE

## 2023-11-09 PROCEDURE — 87800 DETECT AGNT MULT DNA DIREC: CPT | Performed by: ADVANCED PRACTICE MIDWIFE

## 2023-11-09 PROCEDURE — 99213 OFFICE O/P EST LOW 20 MIN: CPT | Performed by: ADVANCED PRACTICE MIDWIFE

## 2023-11-09 RX ORDER — NYSTATIN 100000 [USP'U]/G
1 POWDER TOPICAL 2 TIMES DAILY
Qty: 15 G | Refills: 0 | Status: SHIPPED | OUTPATIENT
Start: 2023-11-09 | End: 2023-11-29

## 2023-11-09 NOTE — PROGRESS NOTES
Subjective     Byron Luis is a 23 y.o.  at 36w1d with a working estimated date of delivery of 2023, by Last Menstrual Period who presents for a routine prenatal visit. Pt endorses vaginal irritation for the past few days, requiring her to take two showers this morning. She notes that her vaginal smell is different and requesting that we look at the area. Pt c/o itching under the breast area. Pt does not remember if she had GBS in previous pregnancy, however she does note that her infant stayed in the NICU for 2 weeks after developing a 101 degF temp.     Has not been seen in 2 months.   Had a lot of stress at home, but reports things are improving. Partner moved back in, got an alarm system at home.   Missed scheduling w/ Dr. White but notes mood is ok.     She denies vaginal bleeding, leakage of fluid, decreased fetal movements, or contractions.    Objective   Physical Exam:   Weight: 97.5 kg (215 lb)  Expected Total Weight Gain: 7 kg (15 lb)-11.5 kg (25 lb)   Pregravid BMI: 29.87  BP: 129/81  Fetal Heart Rate: 155 Fundal Height (cm): 40 cm             Postpartum Depression: Not on file    Physical Exam  Constitutional:       Appearance: Normal appearance.   HENT:      Head: Normocephalic.   Chest:   Breasts:     Right: Normal.      Left: Normal.      Comments: Small erythematous spots underneath the breast in the intertriginous area. No leison or discharge present   Genitourinary:     General: Normal vulva.      Pubic Area: No rash.       Labia:         Right: No rash or lesion.         Left: No rash or lesion.    Neurological:      Mental Status: She is alert.   Psychiatric:         Mood and Affect: Mood normal.         Behavior: Behavior normal.          Problem List Items Addressed This Visit       Supervision of other normal pregnancy, antepartum    Overview       OCPs vs Nexplanon for birth control    breastfeeding   girl   pre-preg BMI 30          Bipolar 1 disorder (CMS/HCC)    Mild  cannabis use disorder    Late prenatal care    History of postpartum depression    History of suicide attempt    Limited prenatal care in third trimester    Overview     CHW referral made   Assessed barriers to transportation, pt notes she was previously able to get to appointments but had trouble accessing these last few times         36 weeks gestation of pregnancy - Primary    Relevant Orders    Group B Streptococcus (GBS) Prenatal Screen, Culture    Drug Screen, Urine With Reflex to Confirmation    Uterine size-date discrepancy in third trimester    Overview     Growth US ordered          Relevant Orders    US MAC OB imaging order    History of marijuana use    Overview     UDS screening collected today          Candidiasis of breast    Relevant Medications    nystatin (Mycostatin) 100,000 unit/gram powder    Need for Tdap vaccination    Overview     Tdap completed today          Other Visit Diagnoses       Vaginal pruritus        Relevant Orders    C. Trachomatis / N. Gonorrhoeae, Amplified Detection    TRICH VAGINALIS, AMPLIFIED            Postpartum contraception options discussed, planning nexplanon   Discussed routine GBS screening, to be completed today  Reviewed previous postpartum visit note, It noted that she had a  without complications like IAI  Reviewed s/sx of PTL, warning signs, fetal movement counts, and when to call provider  Follow up in 1 week for a routine prenatal visit.  at next visit  Discuss labor plans       ERMA Tang APRN-CNM

## 2023-11-10 LAB
C TRACH RRNA SPEC QL NAA+PROBE: NEGATIVE
N GONORRHOEA DNA SPEC QL PROBE+SIG AMP: NEGATIVE
T VAGINALIS RRNA SPEC QL NAA+PROBE: NEGATIVE

## 2023-11-11 ENCOUNTER — TELEPHONE (OUTPATIENT)
Dept: OBSTETRICS AND GYNECOLOGY | Facility: HOSPITAL | Age: 23
End: 2023-11-11
Payer: MEDICAID

## 2023-11-11 PROBLEM — Z3A.36 36 WEEKS GESTATION OF PREGNANCY (HHS-HCC): Status: RESOLVED | Noted: 2023-11-09 | Resolved: 2023-11-11

## 2023-11-11 PROBLEM — O35.9XX0 FETAL ABNORMALITY SUSPECTED, ANTEPARTUM CONDITION (HHS-HCC): Status: RESOLVED | Noted: 2023-10-14 | Resolved: 2023-11-11

## 2023-11-11 PROBLEM — Z34.00: Status: RESOLVED | Noted: 2023-10-14 | Resolved: 2023-11-11

## 2023-11-11 PROBLEM — Z34.80 PRENATAL CARE, SUBSEQUENT PREGNANCY (HHS-HCC): Status: RESOLVED | Noted: 2023-10-14 | Resolved: 2023-11-11

## 2023-11-11 PROBLEM — B37.89 CANDIDIASIS OF BREAST: Status: RESOLVED | Noted: 2023-11-09 | Resolved: 2023-11-11

## 2023-11-11 PROBLEM — Z23 NEED FOR TDAP VACCINATION: Status: RESOLVED | Noted: 2023-11-09 | Resolved: 2023-11-11

## 2023-11-11 PROBLEM — O09.30 LATE PRENATAL CARE (HHS-HCC): Status: RESOLVED | Noted: 2023-10-14 | Resolved: 2023-11-11

## 2023-11-11 PROBLEM — K52.9 GASTROENTERITIS: Status: RESOLVED | Noted: 2023-10-14 | Resolved: 2023-11-11

## 2023-11-11 PROBLEM — Z72.51 HIGH RISK SEXUAL BEHAVIOR: Status: RESOLVED | Noted: 2023-10-14 | Resolved: 2023-11-11

## 2023-11-11 PROBLEM — O09.32 INSUFFICIENT PRENATAL CARE IN SECOND TRIMESTER (HHS-HCC): Status: RESOLVED | Noted: 2023-10-14 | Resolved: 2023-11-11

## 2023-11-11 PROBLEM — O35.9XX0 SUSPECTED FETAL ANOMALY, ANTEPARTUM (HHS-HCC): Status: RESOLVED | Noted: 2023-10-14 | Resolved: 2023-11-11

## 2023-11-12 ENCOUNTER — HOSPITAL ENCOUNTER (OUTPATIENT)
Facility: HOSPITAL | Age: 23
Discharge: HOME | End: 2023-11-12
Attending: STUDENT IN AN ORGANIZED HEALTH CARE EDUCATION/TRAINING PROGRAM | Admitting: STUDENT IN AN ORGANIZED HEALTH CARE EDUCATION/TRAINING PROGRAM
Payer: MEDICAID

## 2023-11-12 VITALS
RESPIRATION RATE: 18 BRPM | OXYGEN SATURATION: 98 % | DIASTOLIC BLOOD PRESSURE: 72 MMHG | BODY MASS INDEX: 34.08 KG/M2 | TEMPERATURE: 97.2 F | HEART RATE: 99 BPM | WEIGHT: 212.08 LBS | HEIGHT: 66 IN | SYSTOLIC BLOOD PRESSURE: 123 MMHG

## 2023-11-12 LAB — GP B STREP GENITAL QL CULT: ABNORMAL

## 2023-11-12 PROCEDURE — 59025 FETAL NON-STRESS TEST: CPT | Performed by: NURSE PRACTITIONER

## 2023-11-12 PROCEDURE — 99213 OFFICE O/P EST LOW 20 MIN: CPT | Performed by: NURSE PRACTITIONER

## 2023-11-12 SDOH — SOCIAL STABILITY: SOCIAL INSECURITY: DOES ANYONE TRY TO KEEP YOU FROM HAVING/CONTACTING OTHER FRIENDS OR DOING THINGS OUTSIDE YOUR HOME?: NO

## 2023-11-12 SDOH — SOCIAL STABILITY: SOCIAL INSECURITY: HAVE YOU HAD THOUGHTS OF HARMING ANYONE ELSE?: YES

## 2023-11-12 SDOH — HEALTH STABILITY: MENTAL HEALTH: HAVE YOU USED ANY PRESCRIPTION DRUGS OTHER THAN PRESCRIBED IN THE PAST 12 MONTHS?: NO

## 2023-11-12 SDOH — ECONOMIC STABILITY: HOUSING INSECURITY: DO YOU FEEL UNSAFE GOING BACK TO THE PLACE WHERE YOU ARE LIVING?: NO

## 2023-11-12 SDOH — HEALTH STABILITY: MENTAL HEALTH: WISH TO BE DEAD (PAST 1 MONTH): NO

## 2023-11-12 SDOH — SOCIAL STABILITY: SOCIAL INSECURITY: DO YOU FEEL ANYONE HAS EXPLOITED OR TAKEN ADVANTAGE OF YOU FINANCIALLY OR OF YOUR PERSONAL PROPERTY?: NO

## 2023-11-12 SDOH — SOCIAL STABILITY: SOCIAL INSECURITY: HAS ANYONE EVER THREATENED TO HURT YOUR FAMILY OR YOUR PETS?: NO

## 2023-11-12 SDOH — SOCIAL STABILITY: SOCIAL INSECURITY: VERBAL ABUSE: DENIES

## 2023-11-12 SDOH — HEALTH STABILITY: MENTAL HEALTH: WERE YOU ABLE TO COMPLETE ALL THE BEHAVIORAL HEALTH SCREENINGS?: YES

## 2023-11-12 SDOH — HEALTH STABILITY: MENTAL HEALTH: NON-SPECIFIC ACTIVE SUICIDAL THOUGHTS (PAST 1 MONTH): NO

## 2023-11-12 SDOH — SOCIAL STABILITY: SOCIAL INSECURITY: PHYSICAL ABUSE: DENIES

## 2023-11-12 SDOH — HEALTH STABILITY: MENTAL HEALTH: SUICIDAL BEHAVIOR (3 MONTHS): NO

## 2023-11-12 SDOH — HEALTH STABILITY: MENTAL HEALTH: SUICIDAL BEHAVIOR (LIFETIME): NO

## 2023-11-12 SDOH — SOCIAL STABILITY: SOCIAL INSECURITY: ARE THERE ANY APPARENT SIGNS OF INJURIES/BEHAVIORS THAT COULD BE RELATED TO ABUSE/NEGLECT?: NO

## 2023-11-12 SDOH — SOCIAL STABILITY: SOCIAL INSECURITY: ABUSE SCREEN: ADULT

## 2023-11-12 SDOH — HEALTH STABILITY: MENTAL HEALTH: HAVE YOU USED ANY SUBSTANCES (CANABIS, COCAINE, HEROIN, HALLUCINOGENS, INHALANTS, ETC.) IN THE PAST 12 MONTHS?: YES

## 2023-11-12 SDOH — SOCIAL STABILITY: SOCIAL INSECURITY: ARE YOU OR HAVE YOU BEEN THREATENED OR ABUSED PHYSICALLY, EMOTIONALLY, OR SEXUALLY BY ANYONE?: NO

## 2023-11-12 ASSESSMENT — LIFESTYLE VARIABLES
HOW OFTEN DO YOU HAVE 6 OR MORE DRINKS ON ONE OCCASION: NEVER
SKIP TO QUESTIONS 9-10: 1
HOW MANY STANDARD DRINKS CONTAINING ALCOHOL DO YOU HAVE ON A TYPICAL DAY: PATIENT DOES NOT DRINK
HOW OFTEN DO YOU HAVE A DRINK CONTAINING ALCOHOL: NEVER
AUDIT-C TOTAL SCORE: 0
AUDIT-C TOTAL SCORE: 0

## 2023-11-12 ASSESSMENT — PAIN SCALES - GENERAL: PAINLEVEL_OUTOF10: 0 - NO PAIN

## 2023-11-12 ASSESSMENT — ACTIVITIES OF DAILY LIVING (ADL): LACK_OF_TRANSPORTATION: NO

## 2023-11-12 NOTE — H&P
Obstetrical Triage Admission History and Physical     Byron Luis is a 23 y.o.  at 36w4d. TODD: 2023, by Last Menstrual Period. She has had prenatal care with Venecia Mckenzie and Nurys Franz CNM .    Assessment    Byron Luis is a 23 y.o.  at 36w4d. TODD: 2023, by Last Menstrual Period.   Decreased fetal movement  NST reactive    Plan    Reassurance given to patient for overall fetal wellbeing, low concern for active labor given CE.  Reviewed s/sx of PTL, warning signs, and when to call provider  Keep scheduled PNC appointment with Shila Butcher CNM on 23  Discharge home.    ERMA Beckett APRN-CNP    Subjective     Chief Complaint: Decreased Fetal Movement    Byron is here complaining of decreased fetal movement. She reports decreased fetal movement since yesterday, has felt baby move ~ 6 times since yesterday. Denies vaginal bleeding. Denies contractions. Denies leaking of fluid.       Pregnancy Problems (from 10/24/23 to present)       Problem Noted Resolved    Limited prenatal care in third trimester 2023 by ERMA Tang No    Priority:  Medium      Overview Signed 2023  1:16 PM by ERMA Tang     CHW referral made   Assessed barriers to transportation, pt notes she was previously able to get to appointments but had trouble accessing these last few times         Uterine size-date discrepancy in third trimester 2023 by ERMA Tang No    Priority:  Medium      Overview Signed 2023 12:58 PM by ERMA Tang     Growth US ordered          History of postpartum depression 10/24/2023 by LAZARA Block No    Priority:  Medium      History of suicide attempt 10/24/2023 by LAZARA Block No    Priority:  Medium      Bipolar 1 disorder (CMS/HCC) 10/14/2023 by ERMA Rajan No    Priority:  Medium      Mild cannabis use disorder 2022 by  Nelia Maddox No    Priority:  Medium      36 weeks gestation of pregnancy 2023 by ERAM Tang 2023 by ERMA Rajan    Late prenatal care 10/14/2023 by Nelia Maddox 2023 by ERMA Rajan             Obstetrical History   OB History    Para Term  AB Living   2 1 1         SAB IAB Ectopic Multiple Live Births                  # Outcome Date GA Lbr Valeriy/2nd Weight Sex Delivery Anes PTL Lv   2 Current            1 Term 2019   3714 g M Vag-Spont          Past Medical History  Past Medical History:   Diagnosis Date    Bipolar 1 disorder (CMS/HCC)         Past Surgical History   No past surgical history on file.    Social History  Social History     Tobacco Use    Smoking status: Never    Smokeless tobacco: Never   Substance Use Topics    Alcohol use: Not on file     Substance and Sexual Activity   Drug Use Yes    Types: Marijuana    Comment: stopped oct 1       Allergies  Red dye     Medications  Medications Prior to Admission   Medication Sig Dispense Refill Last Dose    nystatin (Mycostatin) 100,000 unit/gram powder Apply 1 Application topically 2 times a day. 15 g 0     Se- 19 Chewable 29 mg iron- 1 mg tablet,chewable Chew 1 tablet once daily.          Objective    Last Vitals  Temp Pulse Resp BP MAP O2 Sat   36.2 °C (97.2 °F) 99 18 123/72   98 %     Physical Examination    GENERAL: Examination reveals a well developed, well nourished, gravid female in no acute distress. She is alert and cooperative.  LUNGS: Normal respiratory effort  NEUROLOGICAL: alert, oriented, normal speech, no focal findings or movement disorder noted  PSYCHOLOGICAL: awake and alert; oriented to person, place, and time    Fetal Monitoring      Baseline FHR: 140 per minute  Variability: moderate  Accelerations: yes  Decelerations: none  TOCO: irregular, every 5-6 minutes    Cervical Exam:  2 cm dilated, 60 effaced, -3 station, cervical position  posterior    Lab Review  Group B Strep Screen   Date Value Ref Range Status   11/09/2023 Culture in progress  Preliminary

## 2023-11-12 NOTE — TELEPHONE ENCOUNTER
Pt paged midwife pager, noting that she has only felt the baby move six times today. Advised pt to present to triage for evaluation asap. Will update triage RN.     ERMA Tang

## 2023-11-13 ENCOUNTER — ANCILLARY PROCEDURE (OUTPATIENT)
Dept: RADIOLOGY | Facility: CLINIC | Age: 23
End: 2023-11-13
Payer: MEDICAID

## 2023-11-13 ENCOUNTER — APPOINTMENT (OUTPATIENT)
Dept: OBSTETRICS AND GYNECOLOGY | Facility: CLINIC | Age: 23
End: 2023-11-13
Payer: MEDICAID

## 2023-11-13 DIAGNOSIS — O26.843 UTERINE SIZE-DATE DISCREPANCY IN THIRD TRIMESTER (HHS-HCC): ICD-10-CM

## 2023-11-13 PROBLEM — Z22.330 GBS CARRIER: Status: ACTIVE | Noted: 2023-11-13

## 2023-11-13 PROCEDURE — 76816 OB US FOLLOW-UP PER FETUS: CPT | Performed by: OBSTETRICS & GYNECOLOGY

## 2023-11-13 PROCEDURE — 76816 OB US FOLLOW-UP PER FETUS: CPT

## 2023-11-13 PROCEDURE — 76819 FETAL BIOPHYS PROFIL W/O NST: CPT | Performed by: OBSTETRICS & GYNECOLOGY

## 2023-11-27 ENCOUNTER — TELEPHONE (OUTPATIENT)
Dept: OBSTETRICS AND GYNECOLOGY | Facility: CLINIC | Age: 23
End: 2023-11-27
Payer: MEDICAID

## 2023-11-27 NOTE — TELEPHONE ENCOUNTER
Called Pt spoke with her today. She stated she missed her apt she she had a lot going on today and felt she was not going to make it. Spoke with Pt about the importance of keeping her Her OB apt s up because she is full term. Asked if she needed a ride for the next  KOLE.  She said she will let me know.

## 2023-11-28 ENCOUNTER — HOSPITAL ENCOUNTER (OUTPATIENT)
Facility: HOSPITAL | Age: 23
Discharge: HOME | End: 2023-11-28
Attending: STUDENT IN AN ORGANIZED HEALTH CARE EDUCATION/TRAINING PROGRAM | Admitting: STUDENT IN AN ORGANIZED HEALTH CARE EDUCATION/TRAINING PROGRAM
Payer: MEDICAID

## 2023-11-28 ENCOUNTER — TELEPHONE (OUTPATIENT)
Dept: OBSTETRICS AND GYNECOLOGY | Facility: HOSPITAL | Age: 23
End: 2023-11-28
Payer: MEDICAID

## 2023-11-28 VITALS
DIASTOLIC BLOOD PRESSURE: 74 MMHG | SYSTOLIC BLOOD PRESSURE: 128 MMHG | WEIGHT: 221.34 LBS | HEIGHT: 66 IN | OXYGEN SATURATION: 98 % | BODY MASS INDEX: 35.57 KG/M2 | TEMPERATURE: 98.2 F | HEART RATE: 79 BPM | RESPIRATION RATE: 18 BRPM

## 2023-11-28 LAB
BILIRUBIN, POC: NEGATIVE
BLOOD URINE, POC: NEGATIVE
CLARITY, POC: CLEAR
COLOR, POC: YELLOW
GLUCOSE URINE, POC: NEGATIVE
KETONES, POC: NEGATIVE
LEUKOCYTE EST, POC: NEGATIVE
NITRITE, POC: NEGATIVE
PH, POC: 7
SPECIFIC GRAVITY, POC: 1.01
URINE PROTEIN, POC: NEGATIVE
UROBILINOGEN, POC: 0.2

## 2023-11-28 PROCEDURE — 81002 URINALYSIS NONAUTO W/O SCOPE: CPT | Performed by: DOULA

## 2023-11-28 PROCEDURE — 2500000001 HC RX 250 WO HCPCS SELF ADMINISTERED DRUGS (ALT 637 FOR MEDICARE OP): Performed by: DOULA

## 2023-11-28 PROCEDURE — 99215 OFFICE O/P EST HI 40 MIN: CPT | Mod: 25

## 2023-11-28 PROCEDURE — 87086 URINE CULTURE/COLONY COUNT: CPT | Performed by: DOULA

## 2023-11-28 RX ORDER — ACETAMINOPHEN 325 MG/1
975 TABLET ORAL ONCE
Status: COMPLETED | OUTPATIENT
Start: 2023-11-28 | End: 2023-11-28

## 2023-11-28 RX ORDER — CYCLOBENZAPRINE HCL 10 MG
10 TABLET ORAL ONCE
Status: COMPLETED | OUTPATIENT
Start: 2023-11-28 | End: 2023-11-28

## 2023-11-28 RX ADMIN — ACETAMINOPHEN 975 MG: 325 TABLET ORAL at 13:56

## 2023-11-28 RX ADMIN — CYCLOBENZAPRINE 10 MG: 10 TABLET, FILM COATED ORAL at 13:56

## 2023-11-28 SDOH — SOCIAL STABILITY: SOCIAL INSECURITY: ABUSE SCREEN: ADULT

## 2023-11-28 SDOH — HEALTH STABILITY: MENTAL HEALTH: NON-SPECIFIC ACTIVE SUICIDAL THOUGHTS (PAST 1 MONTH): NO

## 2023-11-28 SDOH — HEALTH STABILITY: MENTAL HEALTH: HAVE YOU USED ANY PRESCRIPTION DRUGS OTHER THAN PRESCRIBED IN THE PAST 12 MONTHS?: NO

## 2023-11-28 SDOH — HEALTH STABILITY: MENTAL HEALTH: SUICIDAL BEHAVIOR (LIFETIME): NO

## 2023-11-28 SDOH — HEALTH STABILITY: MENTAL HEALTH: WERE YOU ABLE TO COMPLETE ALL THE BEHAVIORAL HEALTH SCREENINGS?: YES

## 2023-11-28 SDOH — HEALTH STABILITY: MENTAL HEALTH: STRENGTHS (MUST CHOOSE TWO): SUPPORT FROM FAMILY;SUPPORT FROM FRIENDS

## 2023-11-28 SDOH — SOCIAL STABILITY: SOCIAL INSECURITY: VERBAL ABUSE: DENIES

## 2023-11-28 SDOH — HEALTH STABILITY: MENTAL HEALTH: SUICIDAL BEHAVIOR (3 MONTHS): NO

## 2023-11-28 SDOH — HEALTH STABILITY: MENTAL HEALTH: WISH TO BE DEAD (PAST 1 MONTH): NO

## 2023-11-28 SDOH — SOCIAL STABILITY: SOCIAL INSECURITY: DO YOU FEEL ANYONE HAS EXPLOITED OR TAKEN ADVANTAGE OF YOU FINANCIALLY OR OF YOUR PERSONAL PROPERTY?: NO

## 2023-11-28 SDOH — SOCIAL STABILITY: SOCIAL INSECURITY: DOES ANYONE TRY TO KEEP YOU FROM HAVING/CONTACTING OTHER FRIENDS OR DOING THINGS OUTSIDE YOUR HOME?: NO

## 2023-11-28 SDOH — SOCIAL STABILITY: SOCIAL INSECURITY: HAVE YOU HAD THOUGHTS OF HARMING ANYONE ELSE?: NO

## 2023-11-28 SDOH — SOCIAL STABILITY: SOCIAL INSECURITY: HAS ANYONE EVER THREATENED TO HURT YOUR FAMILY OR YOUR PETS?: NO

## 2023-11-28 SDOH — SOCIAL STABILITY: SOCIAL INSECURITY: ARE THERE ANY APPARENT SIGNS OF INJURIES/BEHAVIORS THAT COULD BE RELATED TO ABUSE/NEGLECT?: NO

## 2023-11-28 SDOH — HEALTH STABILITY: MENTAL HEALTH: HAVE YOU USED ANY SUBSTANCES (CANABIS, COCAINE, HEROIN, HALLUCINOGENS, INHALANTS, ETC.) IN THE PAST 12 MONTHS?: YES

## 2023-11-28 SDOH — ECONOMIC STABILITY: HOUSING INSECURITY: DO YOU FEEL UNSAFE GOING BACK TO THE PLACE WHERE YOU ARE LIVING?: NO

## 2023-11-28 SDOH — SOCIAL STABILITY: SOCIAL INSECURITY: ARE YOU OR HAVE YOU BEEN THREATENED OR ABUSED PHYSICALLY, EMOTIONALLY, OR SEXUALLY BY ANYONE?: NO

## 2023-11-28 SDOH — SOCIAL STABILITY: SOCIAL INSECURITY: PHYSICAL ABUSE: DENIES

## 2023-11-28 ASSESSMENT — LIFESTYLE VARIABLES
HOW MANY STANDARD DRINKS CONTAINING ALCOHOL DO YOU HAVE ON A TYPICAL DAY: PATIENT DOES NOT DRINK
HOW OFTEN DO YOU HAVE 6 OR MORE DRINKS ON ONE OCCASION: NEVER
AUDIT-C TOTAL SCORE: 0
AUDIT-C TOTAL SCORE: 0
SKIP TO QUESTIONS 9-10: 1
HOW OFTEN DO YOU HAVE A DRINK CONTAINING ALCOHOL: NEVER

## 2023-11-28 ASSESSMENT — PAIN SCALES - GENERAL
PAINLEVEL_OUTOF10: 8
PAINLEVEL_OUTOF10: 8

## 2023-11-28 ASSESSMENT — PAIN DESCRIPTION - ORIENTATION: ORIENTATION: LOWER

## 2023-11-28 ASSESSMENT — PAIN DESCRIPTION - LOCATION: LOCATION: PELVIS

## 2023-11-28 NOTE — TELEPHONE ENCOUNTER
Pt. Paging on call service with concerns of discomfort while lying on side. Reports having a hard time getting comfortable, and hip pain/pelvic pain when closing legs on side.  Active baby, no contractions, no VB, or concern for ROM.  Has KOLE tomorrow.   Comfort measures/labor precautions reviewed, pt. Planned on taking a shower and assessing discomfort. Discussed to come for evaluation if needed.     Nurys Gonzales, JOEY-MANSI

## 2023-11-28 NOTE — H&P
Obstetrical Admission History and Physical     Byron Luis is a 23 y.o.  at 38w6d. TODD: 2023, by Last Menstrual Period. Estimated fetal weight: 8lb. She has had prenatal care with [place] [facility] and Nurys Franz .    Assessment    Byron Luis is a 23 y.o.  at 38w6d. TODD: 2023, by Last Menstrual Period.   Pelvic pain   NST reactive     Plan    Pelvic pain   - Suspected pubic symphysis pain   - Two hour recheck --> SVE unchanged at two hour recheck--> four hour recheck--> unchanged   - Tylenol and flexeril ordered --> pt notes her pain is now 7/10 and has improved, pt states that she would prefer to be discharged home  - PO hydration   - Heat packs and supportive therapy   - Tasked [place] RN coordinate belly band with patient  - UC collected     Limited prental care   -Inquired about accessibility of appointments, pt notes that she has reliable transportation and is planning to go to tomorrows appointment     Maternal Well-being  - Vital signs stable and WNL  - Emotional support and reassurance provided  - All questions and concerns addressed  -Answered questions about 39 week IOL, pt is considering, planning to discuss in more detail during prenatal visit tomorrow      Fetal Well-being  - , RNST  - good FM per pt    Dispo  -Appt with CNM tomorrow   -Reviewed labor precautions and to return if decreased FM, LOF, VB and contractions  -Discussed pt status with Dr. Leung, agrees with plan and discharge       Kathy Miles, JOEY-MICHELLEM    Subjective     Chief Complaint: Pelvic Pain    Byron is here complaining of 8/10 pelvic pain, she reports that it started early this morning, notes that ir occurred when she was sleeping, notes that she was unable to go back to sleep because the pain was so intense, states that the pain occurs when she changes positions. She states that she felt cramping during urination but denies dysuria and frequency. Pt declined tylenol,  states that she is skeptical of taking medication during pregnancy. Good fetal movement. Denies vaginal bleeding., Denies contractions., Denies leaking of fluid.   Pt inquired about term IOL     At recheck pt went to the bathroom and noted that she was now having 9/10 pelvic pain. Noted that it occurs with change of position  Pregnancy Problems (from 10/24/23 to present)       Problem Noted Resolved    Limited prenatal care in third trimester 2023 by ERMA Tang No    Priority:  Medium      Overview Signed 2023  1:16 PM by ERMA Tang     CHW referral made   Assessed barriers to transportation, pt notes she was previously able to get to appointments but had trouble accessing these last few times         Uterine size-date discrepancy in third trimester 2023 by ERMA Tang No    Priority:  Medium      Overview Addendum 2023  7:15 PM by ERMA Tang     Growth US ordered    US. Increased interval fetal growth. The ACis at the >99%ile (+ 2.3 SD) and the EFW is at the 96%ile             History of postpartum depression 10/24/2023 by LAZARA Block No    Priority:  Medium      History of suicide attempt 10/24/2023 by LAZARA Block No    Priority:  Medium      Bipolar 1 disorder (CMS/HCC) 10/14/2023 by ERMA Rajan No    Priority:  Medium      Mild cannabis use disorder 2022 by Nelia Maddox No    Priority:  Medium            Priority:  Medium      Late prenatal care 10/14/2023 by Nelia Maddox 2023 by ERMA Rajan    Priority:  Medium               Obstetrical History   OB History    Para Term  AB Living   2 1 1     1   SAB IAB Ectopic Multiple Live Births           1      # Outcome Date GA Lbr Valeriy/2nd Weight Sex Delivery Anes PTL Lv   2 Current            1 Term 2019   3714 g M Vag-Spont   DENIZ       Past Medical History  Past Medical History:   Diagnosis  "Date    Bipolar 1 disorder (CMS/[facility])         Past Surgical History   No past surgical history on file.    Social History  Social History     Tobacco Use    Smoking status: Never    Smokeless tobacco: Never   Substance Use Topics    Alcohol use: Not on file     Substance and Sexual Activity   Drug Use Yes    Types: Marijuana    Comment: stopped oct 1       Allergies  Red dye     Medications  Medications Prior to Admission   Medication Sig Dispense Refill Last Dose    nystatin (Mycostatin) 100,000 unit/gram powder Apply 1 Application topically 2 times a day. 15 g 0        Objective    Last Vitals  Temp Pulse Resp BP MAP O2 Sat   36.3 °C (97.3 °F) 100 18 125/72   98 %     Physical Examination    GENERAL: Examination reveals a well developed, well nourished, gravid female in no acute distress. She is alert and cooperative.  HEENT: PERRLA. External ears normal. Nose normal, no erythema or discharge. Mouth and throat clear.  NECK: supple, no significant adenopathy  LUNGS:  unlabored   ABDOMEN: soft, gravid, nontender, nondistended, no abnormal masses, no epigastric pain,    CERVIX: 3 cm dilated, 50 % effaced, -3 station; MEMBRANES are Intact  NEUROLOGICAL: alert, oriented, normal speech, no focal findings or movement disorder noted  PSYCHOLOGICAL: awake and alert; oriented to person, place, and time    Fetal Monitoring      Baseline FHR: 145 per minute  Variability: moderate  Accelerations: yes  Decelerations: none  TOCO: irregular, every 10 minutes    Cervical Exam:   1051  3 cm dilated, 50 effaced, -3 station  1300 3/50/-3     Membrane status: intact      Lab Review  No results found for: \"ABO\", \"LABRH\", \"ABSCRN\"    "

## 2023-11-29 ENCOUNTER — HOSPITAL ENCOUNTER (INPATIENT)
Facility: HOSPITAL | Age: 23
LOS: 3 days | Discharge: HOME | End: 2023-12-02
Attending: OBSTETRICS & GYNECOLOGY | Admitting: OBSTETRICS & GYNECOLOGY
Payer: MEDICAID

## 2023-11-29 ENCOUNTER — ROUTINE PRENATAL (OUTPATIENT)
Dept: OBSTETRICS AND GYNECOLOGY | Facility: CLINIC | Age: 23
End: 2023-11-29
Payer: MEDICAID

## 2023-11-29 ENCOUNTER — PROCEDURE VISIT (OUTPATIENT)
Dept: OBSTETRICS AND GYNECOLOGY | Facility: CLINIC | Age: 23
End: 2023-11-29
Payer: MEDICAID

## 2023-11-29 VITALS — WEIGHT: 220.4 LBS | BODY MASS INDEX: 35.57 KG/M2 | SYSTOLIC BLOOD PRESSURE: 128 MMHG | DIASTOLIC BLOOD PRESSURE: 81 MMHG

## 2023-11-29 DIAGNOSIS — O28.8 EQUIVOCAL NON-STRESS TEST: ICD-10-CM

## 2023-11-29 DIAGNOSIS — Z22.330 GBS CARRIER: Primary | ICD-10-CM

## 2023-11-29 DIAGNOSIS — Z30.017 NEXPLANON INSERTION: ICD-10-CM

## 2023-11-29 DIAGNOSIS — Z3A.39 39 WEEKS GESTATION OF PREGNANCY (HHS-HCC): ICD-10-CM

## 2023-11-29 LAB
ERYTHROCYTE [DISTWIDTH] IN BLOOD BY AUTOMATED COUNT: 13.1 % (ref 11.5–14.5)
HCT VFR BLD AUTO: 36.3 % (ref 36–46)
HGB BLD-MCNC: 11.7 G/DL (ref 12–16)
MCH RBC QN AUTO: 28.1 PG (ref 26–34)
MCHC RBC AUTO-ENTMCNC: 32.2 G/DL (ref 32–36)
MCV RBC AUTO: 87 FL (ref 80–100)
NRBC BLD-RTO: 0 /100 WBCS (ref 0–0)
PLATELET # BLD AUTO: 234 X10*3/UL (ref 150–450)
RBC # BLD AUTO: 4.16 X10*6/UL (ref 4–5.2)
WBC # BLD AUTO: 8.8 X10*3/UL (ref 4.4–11.3)

## 2023-11-29 PROCEDURE — 36415 COLL VENOUS BLD VENIPUNCTURE: CPT | Performed by: STUDENT IN AN ORGANIZED HEALTH CARE EDUCATION/TRAINING PROGRAM

## 2023-11-29 PROCEDURE — 99222 1ST HOSP IP/OBS MODERATE 55: CPT | Performed by: STUDENT IN AN ORGANIZED HEALTH CARE EDUCATION/TRAINING PROGRAM

## 2023-11-29 PROCEDURE — 86780 TREPONEMA PALLIDUM: CPT | Performed by: STUDENT IN AN ORGANIZED HEALTH CARE EDUCATION/TRAINING PROGRAM

## 2023-11-29 PROCEDURE — 86901 BLOOD TYPING SEROLOGIC RH(D): CPT | Performed by: STUDENT IN AN ORGANIZED HEALTH CARE EDUCATION/TRAINING PROGRAM

## 2023-11-29 PROCEDURE — 59025 FETAL NON-STRESS TEST: CPT

## 2023-11-29 PROCEDURE — 2500000004 HC RX 250 GENERAL PHARMACY W/ HCPCS (ALT 636 FOR OP/ED): Performed by: ADVANCED PRACTICE MIDWIFE

## 2023-11-29 PROCEDURE — 94760 N-INVAS EAR/PLS OXIMETRY 1: CPT

## 2023-11-29 PROCEDURE — 99214 OFFICE O/P EST MOD 30 MIN: CPT

## 2023-11-29 PROCEDURE — 99214 OFFICE O/P EST MOD 30 MIN: CPT | Mod: TH

## 2023-11-29 PROCEDURE — 1120000001 HC OB PRIVATE ROOM DAILY

## 2023-11-29 PROCEDURE — 2500000004 HC RX 250 GENERAL PHARMACY W/ HCPCS (ALT 636 FOR OP/ED): Performed by: STUDENT IN AN ORGANIZED HEALTH CARE EDUCATION/TRAINING PROGRAM

## 2023-11-29 PROCEDURE — 85027 COMPLETE CBC AUTOMATED: CPT | Performed by: STUDENT IN AN ORGANIZED HEALTH CARE EDUCATION/TRAINING PROGRAM

## 2023-11-29 RX ORDER — LABETALOL HYDROCHLORIDE 5 MG/ML
20 INJECTION, SOLUTION INTRAVENOUS ONCE AS NEEDED
Status: DISCONTINUED | OUTPATIENT
Start: 2023-11-29 | End: 2023-11-30

## 2023-11-29 RX ORDER — PENICILLIN G 3000000 [IU]/50ML
3 INJECTION, SOLUTION INTRAVENOUS EVERY 4 HOURS
Status: DISCONTINUED | OUTPATIENT
Start: 2023-11-29 | End: 2023-11-30

## 2023-11-29 RX ORDER — NIFEDIPINE 10 MG/1
10 CAPSULE ORAL ONCE AS NEEDED
Status: DISCONTINUED | OUTPATIENT
Start: 2023-11-29 | End: 2023-11-30

## 2023-11-29 RX ORDER — MISOPROSTOL 200 UG/1
800 TABLET ORAL ONCE AS NEEDED
Status: DISCONTINUED | OUTPATIENT
Start: 2023-11-29 | End: 2023-11-30

## 2023-11-29 RX ORDER — OXYTOCIN 10 [USP'U]/ML
10 INJECTION, SOLUTION INTRAMUSCULAR; INTRAVENOUS ONCE AS NEEDED
Status: DISCONTINUED | OUTPATIENT
Start: 2023-11-29 | End: 2023-11-30

## 2023-11-29 RX ORDER — ONDANSETRON 4 MG/1
4 TABLET, FILM COATED ORAL EVERY 6 HOURS PRN
Status: DISCONTINUED | OUTPATIENT
Start: 2023-11-29 | End: 2023-11-30

## 2023-11-29 RX ORDER — METHYLERGONOVINE MALEATE 0.2 MG/ML
0.2 INJECTION INTRAVENOUS ONCE AS NEEDED
Status: DISCONTINUED | OUTPATIENT
Start: 2023-11-29 | End: 2023-11-30

## 2023-11-29 RX ORDER — CARBOPROST TROMETHAMINE 250 UG/ML
250 INJECTION, SOLUTION INTRAMUSCULAR ONCE AS NEEDED
Status: DISCONTINUED | OUTPATIENT
Start: 2023-11-29 | End: 2023-11-30

## 2023-11-29 RX ORDER — HYDRALAZINE HYDROCHLORIDE 20 MG/ML
5 INJECTION INTRAMUSCULAR; INTRAVENOUS ONCE AS NEEDED
Status: DISCONTINUED | OUTPATIENT
Start: 2023-11-29 | End: 2023-11-30

## 2023-11-29 RX ORDER — METOCLOPRAMIDE 10 MG/1
10 TABLET ORAL EVERY 6 HOURS PRN
Status: DISCONTINUED | OUTPATIENT
Start: 2023-11-29 | End: 2023-11-30

## 2023-11-29 RX ORDER — TERBUTALINE SULFATE 1 MG/ML
0.25 INJECTION SUBCUTANEOUS ONCE AS NEEDED
Status: DISCONTINUED | OUTPATIENT
Start: 2023-11-29 | End: 2023-11-30

## 2023-11-29 RX ORDER — METOCLOPRAMIDE HYDROCHLORIDE 5 MG/ML
10 INJECTION INTRAMUSCULAR; INTRAVENOUS EVERY 6 HOURS PRN
Status: DISCONTINUED | OUTPATIENT
Start: 2023-11-29 | End: 2023-11-30

## 2023-11-29 RX ORDER — OXYTOCIN/0.9 % SODIUM CHLORIDE 30/500 ML
2-30 PLASTIC BAG, INJECTION (ML) INTRAVENOUS CONTINUOUS
Status: DISCONTINUED | OUTPATIENT
Start: 2023-11-29 | End: 2023-11-30

## 2023-11-29 RX ORDER — OXYTOCIN/0.9 % SODIUM CHLORIDE 30/500 ML
60 PLASTIC BAG, INJECTION (ML) INTRAVENOUS ONCE AS NEEDED
Status: DISCONTINUED | OUTPATIENT
Start: 2023-11-29 | End: 2023-11-30

## 2023-11-29 RX ORDER — ONDANSETRON HYDROCHLORIDE 2 MG/ML
4 INJECTION, SOLUTION INTRAVENOUS EVERY 6 HOURS PRN
Status: DISCONTINUED | OUTPATIENT
Start: 2023-11-29 | End: 2023-11-30

## 2023-11-29 RX ORDER — TRANEXAMIC ACID 100 MG/ML
1000 INJECTION, SOLUTION INTRAVENOUS ONCE AS NEEDED
Status: DISCONTINUED | OUTPATIENT
Start: 2023-11-29 | End: 2023-11-30

## 2023-11-29 RX ORDER — LIDOCAINE HYDROCHLORIDE 10 MG/ML
30 INJECTION INFILTRATION; PERINEURAL ONCE AS NEEDED
Status: DISCONTINUED | OUTPATIENT
Start: 2023-11-29 | End: 2023-11-30

## 2023-11-29 RX ORDER — SODIUM CHLORIDE, SODIUM LACTATE, POTASSIUM CHLORIDE, CALCIUM CHLORIDE 600; 310; 30; 20 MG/100ML; MG/100ML; MG/100ML; MG/100ML
125 INJECTION, SOLUTION INTRAVENOUS CONTINUOUS
Status: DISCONTINUED | OUTPATIENT
Start: 2023-11-29 | End: 2023-11-30

## 2023-11-29 RX ORDER — LOPERAMIDE HYDROCHLORIDE 2 MG/1
4 CAPSULE ORAL EVERY 2 HOUR PRN
Status: DISCONTINUED | OUTPATIENT
Start: 2023-11-29 | End: 2023-11-30

## 2023-11-29 RX ADMIN — Medication 2 MILLI-UNITS/MIN: at 22:10

## 2023-11-29 RX ADMIN — SODIUM CHLORIDE, POTASSIUM CHLORIDE, SODIUM LACTATE AND CALCIUM CHLORIDE 125 ML/HR: 600; 310; 30; 20 INJECTION, SOLUTION INTRAVENOUS at 20:47

## 2023-11-29 RX ADMIN — PENICILLIN G POTASSIUM 5 MILLION UNITS: 5000000 INJECTION, POWDER, FOR SOLUTION INTRAMUSCULAR; INTRAVENOUS at 20:48

## 2023-11-29 RX ADMIN — PENICILLIN G 3 MILLION UNITS: 3000000 INJECTION, SOLUTION INTRAVENOUS at 23:40

## 2023-11-29 SDOH — HEALTH STABILITY: MENTAL HEALTH: STRENGTHS (MUST CHOOSE TWO): STABLE DOMESTIC SITUATION;SUPPORT FROM FAMILY

## 2023-11-29 SDOH — HEALTH STABILITY: MENTAL HEALTH: WERE YOU ABLE TO COMPLETE ALL THE BEHAVIORAL HEALTH SCREENINGS?: YES

## 2023-11-29 SDOH — HEALTH STABILITY: MENTAL HEALTH: SUICIDAL BEHAVIOR (LIFETIME): NO

## 2023-11-29 SDOH — SOCIAL STABILITY: SOCIAL INSECURITY: ARE YOU OR HAVE YOU BEEN THREATENED OR ABUSED PHYSICALLY, EMOTIONALLY, OR SEXUALLY BY ANYONE?: NO

## 2023-11-29 SDOH — ECONOMIC STABILITY: HOUSING INSECURITY: DO YOU FEEL UNSAFE GOING BACK TO THE PLACE WHERE YOU ARE LIVING?: NO

## 2023-11-29 SDOH — HEALTH STABILITY: MENTAL HEALTH: HAVE YOU USED ANY SUBSTANCES (CANABIS, COCAINE, HEROIN, HALLUCINOGENS, INHALANTS, ETC.) IN THE PAST 12 MONTHS?: YES

## 2023-11-29 SDOH — SOCIAL STABILITY: SOCIAL INSECURITY: HAS ANYONE EVER THREATENED TO HURT YOUR FAMILY OR YOUR PETS?: NO

## 2023-11-29 SDOH — SOCIAL STABILITY: SOCIAL INSECURITY: ABUSE SCREEN: ADULT

## 2023-11-29 SDOH — HEALTH STABILITY: MENTAL HEALTH: WISH TO BE DEAD (PAST 1 MONTH): NO

## 2023-11-29 SDOH — SOCIAL STABILITY: SOCIAL INSECURITY: DOES ANYONE TRY TO KEEP YOU FROM HAVING/CONTACTING OTHER FRIENDS OR DOING THINGS OUTSIDE YOUR HOME?: NO

## 2023-11-29 SDOH — SOCIAL STABILITY: SOCIAL INSECURITY: ARE THERE ANY APPARENT SIGNS OF INJURIES/BEHAVIORS THAT COULD BE RELATED TO ABUSE/NEGLECT?: NO

## 2023-11-29 SDOH — SOCIAL STABILITY: SOCIAL INSECURITY: VERBAL ABUSE: DENIES

## 2023-11-29 SDOH — HEALTH STABILITY: MENTAL HEALTH: SUICIDAL BEHAVIOR (3 MONTHS): NO

## 2023-11-29 SDOH — HEALTH STABILITY: MENTAL HEALTH: HAVE YOU USED ANY PRESCRIPTION DRUGS OTHER THAN PRESCRIBED IN THE PAST 12 MONTHS?: NO

## 2023-11-29 SDOH — SOCIAL STABILITY: SOCIAL INSECURITY: DO YOU FEEL ANYONE HAS EXPLOITED OR TAKEN ADVANTAGE OF YOU FINANCIALLY OR OF YOUR PERSONAL PROPERTY?: NO

## 2023-11-29 SDOH — SOCIAL STABILITY: SOCIAL INSECURITY: HAVE YOU HAD THOUGHTS OF HARMING ANYONE ELSE?: NO

## 2023-11-29 SDOH — SOCIAL STABILITY: SOCIAL INSECURITY: PHYSICAL ABUSE: DENIES

## 2023-11-29 SDOH — HEALTH STABILITY: MENTAL HEALTH: NON-SPECIFIC ACTIVE SUICIDAL THOUGHTS (PAST 1 MONTH): NO

## 2023-11-29 ASSESSMENT — ENCOUNTER SYMPTOMS
RESPIRATORY NEGATIVE: 0
CONSTITUTIONAL NEGATIVE: 0
CARDIOVASCULAR NEGATIVE: 0
NEUROLOGICAL NEGATIVE: 0
ENDOCRINE NEGATIVE: 0
EYES NEGATIVE: 0
HEMATOLOGIC/LYMPHATIC NEGATIVE: 0
PSYCHIATRIC NEGATIVE: 0
MUSCULOSKELETAL NEGATIVE: 0
GASTROINTESTINAL NEGATIVE: 0
ALLERGIC/IMMUNOLOGIC NEGATIVE: 0

## 2023-11-29 ASSESSMENT — EDINBURGH POSTNATAL DEPRESSION SCALE (EPDS)
TOTAL SCORE: 0
I HAVE BEEN SO UNHAPPY THAT I HAVE HAD DIFFICULTY SLEEPING: NOT AT ALL
I HAVE FELT SAD OR MISERABLE: NO, NOT AT ALL
I HAVE BEEN ANXIOUS OR WORRIED FOR NO GOOD REASON: NO, NOT AT ALL
I HAVE BEEN SO UNHAPPY THAT I HAVE BEEN CRYING: NO, NEVER
I HAVE BEEN ABLE TO LAUGH AND SEE THE FUNNY SIDE OF THINGS: AS MUCH AS I ALWAYS COULD
THE THOUGHT OF HARMING MYSELF HAS OCCURRED TO ME: NEVER
THINGS HAVE BEEN GETTING ON TOP OF ME: NO, I HAVE BEEN COPING AS WELL AS EVER
I HAVE BLAMED MYSELF UNNECESSARILY WHEN THINGS WENT WRONG: NO, NEVER
I HAVE FELT SCARED OR PANICKY FOR NO GOOD REASON: NO, NOT AT ALL
I HAVE LOOKED FORWARD WITH ENJOYMENT TO THINGS: AS MUCH AS I EVER DID

## 2023-11-29 ASSESSMENT — PAIN SCALES - GENERAL
PAINLEVEL: 0 - NO PAIN
PAINLEVEL_OUTOF10: 0 - NO PAIN

## 2023-11-29 ASSESSMENT — PATIENT HEALTH QUESTIONNAIRE - PHQ9
2. FEELING DOWN, DEPRESSED OR HOPELESS: NOT AT ALL
SUM OF ALL RESPONSES TO PHQ9 QUESTIONS 1 & 2: 0
1. LITTLE INTEREST OR PLEASURE IN DOING THINGS: NOT AT ALL

## 2023-11-29 ASSESSMENT — LIFESTYLE VARIABLES
AUDIT-C TOTAL SCORE: 0
HOW MANY STANDARD DRINKS CONTAINING ALCOHOL DO YOU HAVE ON A TYPICAL DAY: PATIENT DOES NOT DRINK
SKIP TO QUESTIONS 9-10: 1
AUDIT-C TOTAL SCORE: 0
HOW OFTEN DO YOU HAVE 6 OR MORE DRINKS ON ONE OCCASION: NEVER
HOW OFTEN DO YOU HAVE A DRINK CONTAINING ALCOHOL: NEVER

## 2023-11-29 NOTE — PROGRESS NOTES
NST non reassuring per Kate Dunn CNM   Pt to to go MAC 2 for extended monitoring   Placed on monitor for fetal tachycardia noted on exam

## 2023-11-29 NOTE — PROGRESS NOTES
Assessment/Plan   Diagnoses and all orders for this visit:  GBS carrier  Discussed Penicillin in in labor    39 weeks gestation of pregnancy    Equivocal NST (non-stress test)   - Pt sent to triage for prolonged FHT/monitoring. Discussed with pt. Report given to Ashli Yanez CNM         (During visit prior to NST)  Discussed expectant management vs. scheduling term IOL, pt desires to hold off on IOL at 39 weeks as she would like to await spontaneous labor but is agreeable to IOL if she has not delivered by 40 weeks.   We reviewed her most recent growth scan with her EFW >96% and AC 99% and discussed  risks for maternal/fetal injury at delivery as EFW increases and discussed increased risk shoulder dystocia at delivery.       Reviewed s/sx of labor, warning signs, fetal movement counts, and when to call provider  Follow up in 1 week for a routine prenatal visit.    ERMA Viramontes    Subjective     Byron Luis is a 23 y.o.  at 39w0d with a working estimated date of delivery of 2023, by Last Menstrual Period who presents for a routine prenatal visit. She denies vaginal bleeding, leakage of fluid, decreased fetal movements, or contractions.    Pt came with questions about IOL vs awaiting spontaneous labor.     Her pregnancy is complicated by:  Pregnancy Problems (from 10/24/23 to present)       Problem Noted Resolved    Limited prenatal care in third trimester 2023 by ERMA Tang No    Overview Signed 2023  1:16 PM by ERMA Tang     CHW referral made   Assessed barriers to transportation, pt notes she was previously able to get to appointments but had trouble accessing these last few times         Uterine size-date discrepancy in third trimester 2023 by ERMA Tang No    Overview Addendum 2023  7:15 PM by ERMA Tang     Growth US ordered    US. Increased interval fetal growth. The ACis at the >99%ile (+ 2.3 SD)  and the EFW is at the 96%ile             History of postpartum depression 10/24/2023 by LAZARA Block No    History of suicide attempt 10/24/2023 by LAZARA Block No    Bipolar 1 disorder (CMS/HCC) 10/14/2023 by ERMA Rajan No    Mild cannabis use disorder 2022 by Nelia Maddox No    36 weeks gestation of pregnancy 2023 by ERMA Tang 2023 by ERMA Rajan    Late prenatal care 10/14/2023 by Nelia Maddox 2023 by ERMA Rajan             Objective   Physical Exam:   Weight: 100 kg (220 lb 6.4 oz)  TW.1 kg (35 lb 6.4 oz)  Expected Total Weight Gain: 7 kg (15 lb)-11.5 kg (25 lb)   Pregravid BMI: 29.87  BP: 128/81                  Postpartum Depression: Not on file        Prenatal Labs  Lab Results   Component Value Date    HGB 12.5 10/24/2023    HCT 38.2 10/24/2023     10/24/2023    ABO O 2023    LABRH POS 2023    NEISSGONOAMP Negative 2023    CHLAMTRACAMP Negative 2023    SYPHT NONREACTIVE 2023    HEPBSAG NONREACTIVE 2023    HIV1X2 NONREACTIVE 2023    URINECULTURE CANCELED 2023     Lab Results   Component Value Date    GLUC1P 97 2023     Group B Strep Screen   Date Value Ref Range Status   2023 (A)  Final    Isolated: Streptococcus agalactiae (Group B Streptococcus)        Imaging  23 @ 36.5 weeks   ( BMI 31 with previous 8 lb infant.)  -Increased interval fetal growth. The Ac is at the >99%ile (+ 2.3 SD) and the EFW is at the 96%ile

## 2023-11-30 ENCOUNTER — ANESTHESIA EVENT (OUTPATIENT)
Dept: OBSTETRICS AND GYNECOLOGY | Facility: HOSPITAL | Age: 23
End: 2023-11-30
Payer: MEDICAID

## 2023-11-30 ENCOUNTER — ANESTHESIA (OUTPATIENT)
Dept: OBSTETRICS AND GYNECOLOGY | Facility: HOSPITAL | Age: 23
End: 2023-11-30
Payer: MEDICAID

## 2023-11-30 LAB
ABO GROUP (TYPE) IN BLOOD: NORMAL
ANTIBODY SCREEN: NORMAL
RH FACTOR (ANTIGEN D): NORMAL
T PALLIDUM AB SER QL: NONREACTIVE

## 2023-11-30 PROCEDURE — 2500000001 HC RX 250 WO HCPCS SELF ADMINISTERED DRUGS (ALT 637 FOR MEDICARE OP): Performed by: MIDWIFE

## 2023-11-30 PROCEDURE — 51701 INSERT BLADDER CATHETER: CPT

## 2023-11-30 PROCEDURE — 7100000016 HC LABOR RECOVERY PER HOUR

## 2023-11-30 PROCEDURE — 2500000004 HC RX 250 GENERAL PHARMACY W/ HCPCS (ALT 636 FOR OP/ED): Performed by: STUDENT IN AN ORGANIZED HEALTH CARE EDUCATION/TRAINING PROGRAM

## 2023-11-30 PROCEDURE — 01967 NEURAXL LBR ANES VAG DLVR: CPT | Performed by: ANESTHESIOLOGY

## 2023-11-30 PROCEDURE — 3700000002 HC GENERAL ANESTHESIA TIME - EACH INCREMENTAL 1 MINUTE: Performed by: ANESTHESIOLOGY

## 2023-11-30 PROCEDURE — 2500000005 HC RX 250 GENERAL PHARMACY W/O HCPCS: Performed by: STUDENT IN AN ORGANIZED HEALTH CARE EDUCATION/TRAINING PROGRAM

## 2023-11-30 PROCEDURE — 3700000001 HC GENERAL ANESTHESIA TIME - INITIAL BASE CHARGE: Performed by: ANESTHESIOLOGY

## 2023-11-30 PROCEDURE — 59410 OBSTETRICAL CARE: CPT | Performed by: MIDWIFE

## 2023-11-30 PROCEDURE — 7210000002 HC LABOR PER HOUR

## 2023-11-30 PROCEDURE — 59050 FETAL MONITOR W/REPORT: CPT

## 2023-11-30 PROCEDURE — 2500000001 HC RX 250 WO HCPCS SELF ADMINISTERED DRUGS (ALT 637 FOR MEDICARE OP)

## 2023-11-30 PROCEDURE — 2500000005 HC RX 250 GENERAL PHARMACY W/O HCPCS: Performed by: MIDWIFE

## 2023-11-30 PROCEDURE — 10907ZC DRAINAGE OF AMNIOTIC FLUID, THERAPEUTIC FROM PRODUCTS OF CONCEPTION, VIA NATURAL OR ARTIFICIAL OPENING: ICD-10-PCS

## 2023-11-30 PROCEDURE — 59409 OBSTETRICAL CARE: CPT | Performed by: MIDWIFE

## 2023-11-30 PROCEDURE — 1100000001 HC PRIVATE ROOM DAILY

## 2023-11-30 PROCEDURE — 0JHF3HZ INSERTION OF CONTRACEPTIVE DEVICE INTO LEFT UPPER ARM SUBCUTANEOUS TISSUE AND FASCIA, PERCUTANEOUS APPROACH: ICD-10-PCS | Performed by: ADVANCED PRACTICE MIDWIFE

## 2023-11-30 PROCEDURE — 3E033VJ INTRODUCTION OF OTHER HORMONE INTO PERIPHERAL VEIN, PERCUTANEOUS APPROACH: ICD-10-PCS

## 2023-11-30 RX ORDER — PETROLATUM 420 MG/G
OINTMENT TOPICAL
Status: COMPLETED
Start: 2023-11-30 | End: 2023-11-30

## 2023-11-30 RX ORDER — LIDOCAINE 560 MG/1
1 PATCH PERCUTANEOUS; TOPICAL; TRANSDERMAL
Status: DISCONTINUED | OUTPATIENT
Start: 2023-11-30 | End: 2023-12-02 | Stop reason: HOSPADM

## 2023-11-30 RX ORDER — ADHESIVE BANDAGE
10 BANDAGE TOPICAL
Status: DISCONTINUED | OUTPATIENT
Start: 2023-11-30 | End: 2023-12-02 | Stop reason: HOSPADM

## 2023-11-30 RX ORDER — CARBOPROST TROMETHAMINE 250 UG/ML
250 INJECTION, SOLUTION INTRAMUSCULAR ONCE AS NEEDED
Status: DISCONTINUED | OUTPATIENT
Start: 2023-11-30 | End: 2023-12-02 | Stop reason: HOSPADM

## 2023-11-30 RX ORDER — OXYTOCIN 10 [USP'U]/ML
10 INJECTION, SOLUTION INTRAMUSCULAR; INTRAVENOUS ONCE AS NEEDED
Status: DISCONTINUED | OUTPATIENT
Start: 2023-11-30 | End: 2023-12-02 | Stop reason: HOSPADM

## 2023-11-30 RX ORDER — OXYTOCIN/0.9 % SODIUM CHLORIDE 30/500 ML
60 PLASTIC BAG, INJECTION (ML) INTRAVENOUS ONCE AS NEEDED
Status: DISCONTINUED | OUTPATIENT
Start: 2023-11-30 | End: 2023-12-02 | Stop reason: HOSPADM

## 2023-11-30 RX ORDER — DIPHENHYDRAMINE HYDROCHLORIDE 50 MG/ML
25 INJECTION INTRAMUSCULAR; INTRAVENOUS EVERY 6 HOURS PRN
Status: DISCONTINUED | OUTPATIENT
Start: 2023-11-30 | End: 2023-12-02 | Stop reason: HOSPADM

## 2023-11-30 RX ORDER — FENTANYL/BUPIVACAINE/NS/PF 2MCG/ML-.1
PLASTIC BAG, INJECTION (ML) INJECTION AS NEEDED
Status: DISCONTINUED | OUTPATIENT
Start: 2023-11-30 | End: 2023-11-30

## 2023-11-30 RX ORDER — BISACODYL 10 MG/1
10 SUPPOSITORY RECTAL DAILY PRN
Status: DISCONTINUED | OUTPATIENT
Start: 2023-11-30 | End: 2023-12-02 | Stop reason: HOSPADM

## 2023-11-30 RX ORDER — HYDRALAZINE HYDROCHLORIDE 20 MG/ML
5 INJECTION INTRAMUSCULAR; INTRAVENOUS ONCE AS NEEDED
Status: DISCONTINUED | OUTPATIENT
Start: 2023-11-30 | End: 2023-12-02 | Stop reason: HOSPADM

## 2023-11-30 RX ORDER — LOPERAMIDE HYDROCHLORIDE 2 MG/1
4 CAPSULE ORAL EVERY 2 HOUR PRN
Status: DISCONTINUED | OUTPATIENT
Start: 2023-11-30 | End: 2023-12-02 | Stop reason: HOSPADM

## 2023-11-30 RX ORDER — SIMETHICONE 80 MG
80 TABLET,CHEWABLE ORAL 4 TIMES DAILY PRN
Status: DISCONTINUED | OUTPATIENT
Start: 2023-11-30 | End: 2023-12-02 | Stop reason: HOSPADM

## 2023-11-30 RX ORDER — DIPHENHYDRAMINE HCL 25 MG
25 CAPSULE ORAL EVERY 6 HOURS PRN
Status: DISCONTINUED | OUTPATIENT
Start: 2023-11-30 | End: 2023-12-02 | Stop reason: HOSPADM

## 2023-11-30 RX ORDER — ONDANSETRON 4 MG/1
4 TABLET, FILM COATED ORAL EVERY 6 HOURS PRN
Status: DISCONTINUED | OUTPATIENT
Start: 2023-11-30 | End: 2023-12-02 | Stop reason: HOSPADM

## 2023-11-30 RX ORDER — POLYETHYLENE GLYCOL 3350 17 G/17G
17 POWDER, FOR SOLUTION ORAL 2 TIMES DAILY PRN
Status: DISCONTINUED | OUTPATIENT
Start: 2023-11-30 | End: 2023-12-02 | Stop reason: HOSPADM

## 2023-11-30 RX ORDER — METHYLERGONOVINE MALEATE 0.2 MG/ML
0.2 INJECTION INTRAVENOUS ONCE AS NEEDED
Status: DISCONTINUED | OUTPATIENT
Start: 2023-11-30 | End: 2023-12-02 | Stop reason: HOSPADM

## 2023-11-30 RX ORDER — ONDANSETRON HYDROCHLORIDE 2 MG/ML
4 INJECTION, SOLUTION INTRAVENOUS EVERY 6 HOURS PRN
Status: DISCONTINUED | OUTPATIENT
Start: 2023-11-30 | End: 2023-12-02 | Stop reason: HOSPADM

## 2023-11-30 RX ORDER — LIDOCAINE HCL/EPINEPHRINE/PF 2%-1:200K
VIAL (ML) INJECTION AS NEEDED
Status: DISCONTINUED | OUTPATIENT
Start: 2023-11-30 | End: 2023-11-30

## 2023-11-30 RX ORDER — MISOPROSTOL 200 UG/1
800 TABLET ORAL ONCE AS NEEDED
Status: DISCONTINUED | OUTPATIENT
Start: 2023-11-30 | End: 2023-12-02 | Stop reason: HOSPADM

## 2023-11-30 RX ORDER — LABETALOL HYDROCHLORIDE 5 MG/ML
20 INJECTION, SOLUTION INTRAVENOUS ONCE AS NEEDED
Status: DISCONTINUED | OUTPATIENT
Start: 2023-11-30 | End: 2023-12-02 | Stop reason: HOSPADM

## 2023-11-30 RX ORDER — ACETAMINOPHEN 325 MG/1
975 TABLET ORAL EVERY 6 HOURS
Status: DISCONTINUED | OUTPATIENT
Start: 2023-11-30 | End: 2023-12-02 | Stop reason: HOSPADM

## 2023-11-30 RX ORDER — FENTANYL/BUPIVACAINE/NS/PF 2MCG/ML-.1
PLASTIC BAG, INJECTION (ML) INJECTION CONTINUOUS PRN
Status: DISCONTINUED | OUTPATIENT
Start: 2023-11-30 | End: 2023-11-30

## 2023-11-30 RX ORDER — TRANEXAMIC ACID 100 MG/ML
1000 INJECTION, SOLUTION INTRAVENOUS ONCE AS NEEDED
Status: DISCONTINUED | OUTPATIENT
Start: 2023-11-30 | End: 2023-12-02 | Stop reason: HOSPADM

## 2023-11-30 RX ORDER — IBUPROFEN 600 MG/1
600 TABLET ORAL EVERY 6 HOURS
Status: DISCONTINUED | OUTPATIENT
Start: 2023-11-30 | End: 2023-12-02 | Stop reason: HOSPADM

## 2023-11-30 RX ORDER — NIFEDIPINE 10 MG/1
10 CAPSULE ORAL ONCE AS NEEDED
Status: DISCONTINUED | OUTPATIENT
Start: 2023-11-30 | End: 2023-12-02 | Stop reason: HOSPADM

## 2023-11-30 RX ADMIN — LIDOCAINE HYDROCHLORIDE,EPINEPHRINE BITARTRATE 3 ML: 20; .005 INJECTION, SOLUTION EPIDURAL; INFILTRATION; INTRACAUDAL; PERINEURAL at 01:52

## 2023-11-30 RX ADMIN — SODIUM CHLORIDE, POTASSIUM CHLORIDE, SODIUM LACTATE AND CALCIUM CHLORIDE 125 ML/HR: 600; 310; 30; 20 INJECTION, SOLUTION INTRAVENOUS at 06:03

## 2023-11-30 RX ADMIN — SODIUM CHLORIDE, POTASSIUM CHLORIDE, SODIUM LACTATE AND CALCIUM CHLORIDE 500 ML: 600; 310; 30; 20 INJECTION, SOLUTION INTRAVENOUS at 01:00

## 2023-11-30 RX ADMIN — SODIUM CHLORIDE, POTASSIUM CHLORIDE, SODIUM LACTATE AND CALCIUM CHLORIDE 125 ML/HR: 600; 310; 30; 20 INJECTION, SOLUTION INTRAVENOUS at 02:15

## 2023-11-30 RX ADMIN — IBUPROFEN 600 MG: 600 TABLET, FILM COATED ORAL at 17:32

## 2023-11-30 RX ADMIN — Medication 5 ML: at 01:53

## 2023-11-30 RX ADMIN — IBUPROFEN 600 MG: 600 TABLET, FILM COATED ORAL at 11:05

## 2023-11-30 RX ADMIN — Medication 14 ML/HR: at 01:53

## 2023-11-30 RX ADMIN — PETROLATUM: 1 OINTMENT TOPICAL at 00:45

## 2023-11-30 RX ADMIN — ONDANSETRON 4 MG: 2 INJECTION INTRAMUSCULAR; INTRAVENOUS at 05:54

## 2023-11-30 RX ADMIN — IBUPROFEN 600 MG: 600 TABLET, FILM COATED ORAL at 23:34

## 2023-11-30 RX ADMIN — PENICILLIN G 3 MILLION UNITS: 3000000 INJECTION, SOLUTION INTRAVENOUS at 07:49

## 2023-11-30 RX ADMIN — ACETAMINOPHEN 975 MG: 325 TABLET ORAL at 11:05

## 2023-11-30 RX ADMIN — PENICILLIN G 3 MILLION UNITS: 3000000 INJECTION, SOLUTION INTRAVENOUS at 04:00

## 2023-11-30 RX ADMIN — ACETAMINOPHEN 975 MG: 325 TABLET ORAL at 23:33

## 2023-11-30 RX ADMIN — BENZOCAINE AND LEVOMENTHOL 1 APPLICATION: 200; 5 SPRAY TOPICAL at 13:56

## 2023-11-30 RX ADMIN — Medication 5 ML: at 01:52

## 2023-11-30 RX ADMIN — Medication 1 EACH: at 13:56

## 2023-11-30 RX ADMIN — ACETAMINOPHEN 975 MG: 325 TABLET ORAL at 17:32

## 2023-11-30 SDOH — HEALTH STABILITY: MENTAL HEALTH: CURRENT SMOKER: 0

## 2023-11-30 ASSESSMENT — PAIN SCALES - GENERAL
PAINLEVEL_OUTOF10: 4
PAINLEVEL_OUTOF10: 0 - NO PAIN
PAINLEVEL_OUTOF10: 4
PAINLEVEL_OUTOF10: 5 - MODERATE PAIN
PAINLEVEL_OUTOF10: 0 - NO PAIN

## 2023-11-30 NOTE — ANESTHESIA PREPROCEDURE EVALUATION
Patient: Byron Luis    Evaluation Method: In-person visit    Procedure Information    Date: 11/30/23  Procedure: Labor Consult         Relevant Problems   Neuro/Psych   (+) Bipolar 1 disorder (CMS/HCC)   (+) Bipolar depression (CMS/HCC)   (+) Severe episode of recurrent major depressive disorder, without psychotic features (CMS/HCC)      Infectious Disease   (+) Tinea versicolor   Headaches without specific triggers    Clinical information reviewed:   Tobacco  Allergies  Meds   Med Hx  Surg Hx   Fam Hx  Soc Hx        Brother: sudden cardiac arrest at 17    NPO Detail:  No data recorded     OB/GYN     Physical Exam    Airway  Mallampati: II  TM distance: >3 FB  Neck ROM: full     Cardiovascular - normal exam     Dental - normal exam     Pulmonary - normal exam     Abdominal      Other findings: Gravid          Anesthesia Plan    ASA 2     epidural     The patient is not a current smoker.    Anesthetic plan and risks discussed with patient.

## 2023-11-30 NOTE — PROGRESS NOTES
S: pt feeling rectal pressure    O: Cervical Exam:10/100/0  FHR     Baseline:145     Variability:mod     Accels:present     Decels:none     Category: 1  TOCO: 2 2-3  Oxytocin:12  Membrane status: arom     Color of fluid:clear       A: IUP at  39.1 weeks      And stage labor     Cat 1    GBS Pos    LGA    P: Continue assessment of maternal and fetal well-being      Continue to evaluate the progress of labor      Continue pitocin per protocol      Anticipate         aware of plan- aware of 2nd stage      Reviewed risks of shoulder dystocia with pt- pt aware and consents to proceed with 2nd stage      JOEY Sellers-MANSI

## 2023-11-30 NOTE — ANESTHESIA PROCEDURE NOTES
Epidural Block    Patient location during procedure: OB  Start time: 11/30/2023 1:44 AM  End time: 11/30/2023 2:05 AM  Reason for block: labor analgesia  Staffing  Performed: resident   Authorized by: Jah Carey MD    Performed by: Mariann Saldivar DO    Preanesthetic Checklist  Completed: patient identified, IV checked, risks and benefits discussed, surgical consent, pre-op evaluation, timeout performed and sterile techniques followed  Block Timeout  RN/Licensed healthcare professional reads aloud to the Anesthesia provider and entire team: Patient identity, procedure with side and site, patient position, and as applicable the availability of implants/special equipment/special requirements.  Patient on coagulant treatment: no  Timeout performed at: 11/30/2023 1:44 AM  Block Placement  Patient position: sitting  Prep: ChloraPrep  Sterility prep: cap, drape, gloves and mask  Sedation level: no sedation  Patient monitoring: blood pressure, continuous pulse oximetry and heart rate  Approach: midline  Local numbing: lidocaine 1% to skin and subcutaneous tissues  Vertebral space: lumbar  Lumbar location: L3-L4  Epidural  Loss of resistance technique: saline  Guidance: landmark technique        Needle  Needle type: Tuohy   Needle gauge: 17  Needle length: 10 cm  Needle insertion depth: 7 cm  Catheter type: multi-orifice  Catheter size: 18 G  Catheter at skin depth: 10 cm  Catheter securement method: clear occlusive dressing    Test dose: lidocaine 1.5% with epinephrine 1-to-200,000  Test dose given at 11/30/2023 1:52 AM  Test dose: lidocaine 1.5% with epinephrine 1-to-200,000  Test dose result: no positive test dose            Assessment  Sensory level: T9. bilateral  Block outcome: patient comfortable  Number of attempts: 1  Events: no positive test dose  Procedure assessment: patient tolerated procedure well with no immediate complications

## 2023-11-30 NOTE — PROGRESS NOTES
Pt resting in bed with epidural. Feels mild intermittent pelvic pressure, would like to be checked.  , mod variability, +accels, no decels.  [place] q2-3 min.  Pit @ 12mu.  SVE 6/80/-1.  Continue current course.  Anticipate svb.    Magdalena pitt cnm

## 2023-11-30 NOTE — H&P
Obstetrical Admission History and Physical     Byron Luis is a 23 y.o.  at 39w0d. TODD: 2023, by Last Menstrual Period. Estimated fetal weight: 4200 g extrapolated. She has had limited prenatal care.    Chief Complaint: monitoring (Prolonged monitoring d/t non-reassuring NST in office )    Assessment/Plan    IOL - NRFHT  - Indicated in s/o multiple decels today   - Admit to L&D  - Scanned by Jackie Goodrich PA-C, cephalic   - needs consent  - needs cervical exam  - Monitor VS per unit protocol  - Routine admission labs ordered   - Encourage frequent position changes  - Regular diet  - Continuous fetal monitoring  - Pain management per pt request  - Continue assessment of maternal and fetal well being  - Recheck as clinically indicated by maternal and/or fetal status  - Delivery plan: pt desires vaginal delivery, pt counselied on risks of labor, vaginal delivery, and possibility f C/S for varying maternal or fetal indications  - Will AROM and/or start Pitocin for labor augmentation as needed    Maternal Well-being  - Vital signs stable and WNL  - Emotional support and reassurance provided  - All questions and concerns addressed    IUP @ 39w0d  - prenatal lab work reviewed  - Cat 1 tracing   - GBS positive, PCN ordered   - EFW 4200 g extrapolated from last growth   - continue routine prenatal care     Dispo: admit for IOL     Dr. Alves and Magdalena Root CNM aware of admission  Pt report to CNM team for further management of care    Jackie Goodrich PA-C  23 8:03 PM  [place]    Principal Problem:    Labor and delivery, indication for care      Pregnancy Problems (from 10/24/23 to present)       Problem Noted Resolved    Labor and delivery, indication for care 2023 by Jackie Goodrich PA-C No    Priority:  Medium      Limited prenatal care in third trimester 2023 by ERMA Tang No    Priority:  Medium      Overview Signed 2023  1:16 PM by ERMA Tang      CHW referral made   Assessed barriers to transportation, pt notes she was previously able to get to appointments but had trouble accessing these last few times         Uterine size-date discrepancy in third trimester 11/9/2023 by ERMA Tang No    Priority:  Medium      Overview Addendum 11/13/2023  7:15 PM by ERMA Tang     Growth US ordered   11/13 US. Increased interval fetal growth. The ACis at the >99%ile (+ 2.3 SD) and the EFW is at the 96%ile             History of postpartum depression 10/24/2023 by LAZARA Block No    Priority:  Medium      History of suicide attempt 10/24/2023 by LAZARA Block No    Priority:  Medium      Bipolar 1 disorder (CMS/HCC) 10/14/2023 by ERMA Rajan No    Priority:  Medium      Mild cannabis use disorder 6/2/2022 by Nelia Maddox No    Priority:  Medium      36 weeks gestation of pregnancy 11/9/2023 by ERMA Tang 11/11/2023 by ERMA Rajan    Late prenatal care 10/14/2023 by Nelia Maddox 11/11/2023 by ERMA Rajan          Consent obtained by Magdalena Root CNM. Options for delivery have been discussed with the patient and she elects for an induction of labor.  Cervical ripening with cytotec, cervidil, other prostaglandin agents has been discussed.  Induction of labor with pitocin, amniotomy, cytotec, and cervical balloon have been discussed in detail. The risks, benefits, complications, alternatives, expected outcomes, potential problems during recuperation and recovery, and the risks of not performing the procedure were discussed with the patient. The patient stated understanding that the risks of delivery include, but are not limited to: death; reaction to medications; injury to bowel, bladder, ureters, uterus, cervix, vagina, and other pelvic and abdominal structures, infection; blood loss and possible need for transfusion; and potential need for  surgery, including hysterectomy. The risks of injury to the infant during delivery were also discussed. All questions were answered. There was concurrence with the planned procedure, and the patient wanted to proceed.    Admit to inpatient status. I anticipate that this patient will require a stay exceeding at least 2 midnights for delivery and postpartum.  Induction of labor.  Management of pregnancy complications, as indicated.    Ralf Matthews is a 23 y.o.  at 39w0d by LMP c/w 19w0d US who presents to triage for prolonged monitoring. Was seen in office by CNM today and had 2 decels. Denies VB or LOF. Feeling mild contractions every 10 minutes. Reports good FM.    Reason for Induction of Labor:  Compromised fetal status with current fetal monitoring    Obstetrical History   OB History    Para Term  AB Living   2 1 1     1   SAB IAB Ectopic Multiple Live Births           1      # Outcome Date GA Lbr Valeriy/2nd Weight Sex Delivery Anes PTL Lv   2 Current            1 Term 2019   3714 g M Vag-Spont   DENIZ       Past Medical History  Past Medical History:   Diagnosis Date    Bipolar 1 disorder (CMS/[facility])         Past Surgical History   History reviewed. No pertinent surgical history.    Social History  Social History     Tobacco Use    Smoking status: Never    Smokeless tobacco: Never   Substance Use Topics    Alcohol use: Not on file     Substance and Sexual Activity   Drug Use Yes    Types: Marijuana    Comment: stopped oct 1       Allergies  Red dye     Medications  No medications prior to admission.       Objective    Last Vitals  Temp Pulse Resp BP MAP O2 Sat   36.7 °C (98.1 °F) 98 18 122/62   (!) 94 %     Physical Examination  GENERAL: Examination reveals a well developed, well nourished, gravid female in no acute distress. She is alert and cooperative.  LUNGS:  normal respiratory effort  ABDOMEN: soft, gravid, nontender, nondistended, no abnormal masses, no epigastric pain  FHR is 140  with Accelerations, decel at 1906, mod variability and a Category I tracing.    [place] reading: ctxs every 5-10 minutes   The fetus is in a vertex presentation, determined by ultrasound  NEUROLOGICAL: alert, oriented, normal speech, no focal findings or movement disorder noted  PSYCHOLOGICAL: awake and alert; oriented to person, place, and time    Lab Review  Labs in chart were reviewed.

## 2023-11-30 NOTE — ANESTHESIA POSTPROCEDURE EVALUATION
Patient: Byron Luis    Procedure Summary       Date: 11/30/23 Room / Location:     Anesthesia Start: 0144 Anesthesia Stop: 0909    Procedure: Labor Analgesia Diagnosis:     Scheduled Providers:  Responsible Provider: Jah Carey MD    Anesthesia Type: epidural ASA Status: 2            Anesthesia Type: epidural  Vitals:    11/30/23 1120   BP: 124/77   Pulse: 88   Resp: 18   Temp: 36.5 °C (97.7 °F)   SpO2: 97%          Anesthesia Post Evaluation    Patient location during evaluation: floor  Patient participation: complete - patient participated  Level of consciousness: awake  Pain management: adequate  Airway patency: patent  Cardiovascular status: acceptable  Respiratory status: acceptable  Hydration status: acceptable  Postoperative Nausea and Vomiting: none  Comments: No signs of PDPH, pt ambulating, and using restroom appropriately.  No complications related to neuraxial anesthesia noted.        No notable events documented.

## 2023-11-30 NOTE — PROGRESS NOTES
Pt resting in bed, feels ctx but not painful yet. Would like to be checked and arom'd if possible.  , mod variability, +accels, no decels.  [place] q2-5 min.  Pit @ 4mu.  SVE 4/50/-2, arom'd for clear fluid.  Continue pit and pcn, pain management as needed.  Anticipate svb.    Magdalena pitt cnm

## 2023-11-30 NOTE — CARE PLAN
"The patient's goals for the shift include \"To have a safe deilvery if baby arrives tonight.\"    The clinical goals for the shift include Patient will have reassuring FHR and uterine activity and continue with PNC.      Problem: Antepartum  Goal: Maintain pregnancy as long as maternal and/or fetal condition is stable  Outcome: Met  Goal: Avoid/minimize constipation  Outcome: Met  Goal: No decrease in circulation/VTE  Outcome: Met  Goal: FHR remains reassuring  Outcome: Met  Goal: Minimize anxiety/maximize coping  Outcome: Met     Problem: Vaginal Birth or  Section  Goal: Fetal and maternal status remain reassuring during the birth process  Outcome: Progressing  Goal: Prevention of malpresentation/labor dystocia through delivery  Outcome: Progressing  Goal: Demonstrates labor coping techniques through delivery  Outcome: Met  Goal: Minimal s/sx of HDP and BP<160/110  Outcome: Met  Goal: No s/sx of infection through recovery  Outcome: Progressing  Goal: No s/sx of hemorrhage through recovery  Outcome: Progressing     "

## 2023-11-30 NOTE — PROGRESS NOTES
Pt resting comfortably with epidural, no complaints.  , mod variability, +accels, no decels.  [place] q2-3 min.  Pit @ 10mu.  SVE 5/70/-2.  Continue current course.  Anticipate svb.    Magdalena pitt cnm

## 2023-11-30 NOTE — CARE PLAN
"The patient's goals for the shift include \"To have a safe deilvery if baby arrives tonight.\"    The clinical goals for the shift include VS remain stable    Patient's vss, assessment stable, pain well controlled, lochia light,   "

## 2023-11-30 NOTE — L&D DELIVERY NOTE
OB Delivery Note  2023  Byron GRAYSNO Toby  23 y.o.   Vaginal, Spontaneous        Gestational Age: 39w1d  /Para:   Quantitative Blood Loss: Admission to Discharge: 150 mL (2023  6:09 PM - 2023 10:14 AM)    Ravindra Luis [[id number]]      Labor Events    Rupture date/time: 2023 1211  Rupture type: Artificial  Fluid color: Clear  Fluid odor: None  Labor type: Induced Onset of Labor  Labor allowed to proceed with plans for an attempted vaginal birth?: Yes  Induction: Oxytocin  Induction indications: Risk Reducing       Labor Event Times    Labor onset date/time: 2023  Dilation complete date/time: 2023  Start pushing date/time: 2023       Labor Length    1st stage: 10h 00m  2nd stage: 0h 39m  3rd stage: 0h 05m       Placenta    Placenta delivery date/time: 2023 0914  Placenta removal: Spontaneous  Placenta appearance: Intact  Placenta disposition: pathology       Cord    Vessels: 3 vessels  Complications: None  Delayed cord clamping?: Yes  Cord blood disposition: Lab  Gases sent?: No       Lacerations    Episiotomy: None  Perineal laceration: None       Anesthesia    Method: Epidural       Operative Delivery    Forceps attempted?: No  Vacuum extractor attempted?: No       Shoulder Dystocia    Shoulder dystocia present?: No        Delivery    Birth date/time: 2023 09:09:00  Delivery type: Vaginal, Spontaneous       Resuscitation    Method: Suctioning, Tactile stimulation       Apgars    Living status: Living  Apgar Component Scores:  1 min.:  5 min.:  10 min.:  15 min.:  20 min.:    Skin color:  1  1       Heart rate:  2  2       Reflex irritability:  2  2       Muscle tone:  2  2       Respiratory effort:  2  2       Total:  9  9       Apgars assigned by: WATSON VIDALES RN       Delivery Providers    Delivering clinician: JOEY Sellers-MANSI   Provider Role    Ninfa Saunders RN Delivery Nurse    Delores Vidales RN  Nursery Nurse     Resident                 Magdalena Shepherd, APRN-CNM

## 2023-12-01 ENCOUNTER — PHARMACY VISIT (OUTPATIENT)
Dept: PHARMACY | Facility: CLINIC | Age: 23
End: 2023-12-01
Payer: MEDICAID

## 2023-12-01 LAB — BACTERIA UR CULT: ABNORMAL

## 2023-12-01 PROCEDURE — 2500000001 HC RX 250 WO HCPCS SELF ADMINISTERED DRUGS (ALT 637 FOR MEDICARE OP): Performed by: MIDWIFE

## 2023-12-01 PROCEDURE — 11981 INSERTION DRUG DLVR IMPLANT: CPT | Performed by: ADVANCED PRACTICE MIDWIFE

## 2023-12-01 PROCEDURE — 1100000001 HC PRIVATE ROOM DAILY

## 2023-12-01 PROCEDURE — 2500000005 HC RX 250 GENERAL PHARMACY W/O HCPCS: Performed by: MIDWIFE

## 2023-12-01 PROCEDURE — 2500000005 HC RX 250 GENERAL PHARMACY W/O HCPCS: Performed by: NURSE PRACTITIONER

## 2023-12-01 PROCEDURE — RXMED WILLOW AMBULATORY MEDICATION CHARGE

## 2023-12-01 PROCEDURE — 2500000004 HC RX 250 GENERAL PHARMACY W/ HCPCS (ALT 636 FOR OP/ED): Performed by: NURSE PRACTITIONER

## 2023-12-01 RX ORDER — IBUPROFEN 600 MG/1
600 TABLET ORAL EVERY 6 HOURS PRN
Qty: 120 TABLET | Refills: 0 | Status: SHIPPED | OUTPATIENT
Start: 2023-12-01 | End: 2023-12-31

## 2023-12-01 RX ORDER — LIDOCAINE HYDROCHLORIDE 10 MG/ML
3 INJECTION INFILTRATION; PERINEURAL ONCE AS NEEDED
Status: COMPLETED | OUTPATIENT
Start: 2023-12-01 | End: 2023-12-01

## 2023-12-01 RX ORDER — DOCUSATE SODIUM 100 MG/1
100 CAPSULE, LIQUID FILLED ORAL 2 TIMES DAILY PRN
Qty: 60 CAPSULE | Refills: 0 | Status: SHIPPED | OUTPATIENT
Start: 2023-12-01 | End: 2023-12-31

## 2023-12-01 RX ORDER — ACETAMINOPHEN 325 MG/1
1000 TABLET ORAL EVERY 6 HOURS PRN
Qty: 90 TABLET | Refills: 0 | Status: SHIPPED | OUTPATIENT
Start: 2023-12-01

## 2023-12-01 RX ADMIN — IBUPROFEN 600 MG: 600 TABLET, FILM COATED ORAL at 22:18

## 2023-12-01 RX ADMIN — Medication 1 EACH: at 00:03

## 2023-12-01 RX ADMIN — Medication 1 EACH: at 09:22

## 2023-12-01 RX ADMIN — LIDOCAINE HYDROCHLORIDE 3 ML: 10 INJECTION, SOLUTION INFILTRATION; PERINEURAL at 15:26

## 2023-12-01 RX ADMIN — ACETAMINOPHEN 975 MG: 325 TABLET ORAL at 22:18

## 2023-12-01 RX ADMIN — ETONOGESTREL 1 EACH: 68 IMPLANT SUBCUTANEOUS at 15:25

## 2023-12-01 RX ADMIN — IBUPROFEN 600 MG: 600 TABLET, FILM COATED ORAL at 05:38

## 2023-12-01 RX ADMIN — ACETAMINOPHEN 975 MG: 325 TABLET ORAL at 05:38

## 2023-12-01 ASSESSMENT — PAIN SCALES - GENERAL
PAINLEVEL_OUTOF10: 4
PAINLEVEL_OUTOF10: 6
PAINLEVEL_OUTOF10: 0 - NO PAIN
PAINLEVEL_OUTOF10: 0 - NO PAIN

## 2023-12-01 ASSESSMENT — PAIN DESCRIPTION - DESCRIPTORS: DESCRIPTORS: CRAMPING

## 2023-12-01 NOTE — CARE PLAN
The patient's goals for the shift include feed baby Q2-3H during shift    The clinical goals for the shift include VS remain stable

## 2023-12-01 NOTE — PROGRESS NOTES
Social Work Assessment     Patient: Byron Luis, 22yo,   Address: [address] [place] [address]  Phone: [phone]    Referral Reason: hx of postpartum depression    Prenatal Care: [facility] x 5  Barriers: denies    [place] Name: David Vargas  [place] : 24    Other Children: Garfield, 3yo    FOB: Ms Luis identifies FOB as Sam, states they live together and he is involved and supportive    Household Composition: Ms Luis, FOB/SO, and children    Supports: Ms Luis reports she has excellent support. She states FOB is her primary support. She states both families are also supportive, PGF caring for older child this admission.     IPV/DV or Safety Concerns: Ms Luis denies     Car-Seat: yes - provided by CH[facility]  Safe Sleep Space: yes  Safe Sleep Education: reviewed    Transportation Concerns: denies; Ms Luis reports FOB or family generally give her a ride but she does take the bus if necessary. SW provided referral to [facility] program.     School/Work/Income: Ms Luis reports she is on leave from works; tentatively plans to return in Feb but has support from FOB if she decides to stay home longer. Ms Luis reports FOB works 4 days a week, limited leave but home and very supportive 3 day per week. Ms Luis reports family receives food stamps and medical benefits, also signed up for WIC and will add  now that she has delivered.     Insurance: [employer], will add     Substance Use History: denies    Mental Health Diagnoses/Concerns: Ms Luis with a hx of postpartum depression with SI in 2019. She confirms, reports she was in counseling and on medication for a time after but has not been connected to services this pregnancy. She states she feels she had good support last time but didn't feel comfortable reaching out. She states she has a plan now to reach out to family for support as soon as she feels symptoms, also has supports already in place to  assist with 5yo. Ms Luis also states she plans to reach out for mental health services immediately if she has any symptoms: she plans to [facility] and restart meds. She is aware she can reach out to outpatient OB for meds as well as they discussed starting them this pregnancy. Ms Luis states she will take medication if needed but prefers not to. Her family will check in with her and she plans to wait to connected to services if needed. SW reviewed postpartum depression signs, symptoms, and resources and when and how to access emergency help and Ms Luis indicated understanding.     Bonding:Ms Luis appears to be bonding and participating in care appropriately.     Substance Use History: Ms Luis with a hx of mj use, tox screen negative 23    Assessment: SW met with Ms Luis for assessment due to history of postpartum depression with SI. She was accepting and engaged. She has needed items for  and good support and has a plan in place for her mental health. SW reviewed safe sleep and postpartum depression. No concerns noted.     Plan: Ms Luis and  clear from SW perspective.     Signature: SAYRA Montelongo

## 2023-12-01 NOTE — CARE PLAN
The patient's goals for the shift include feed baby Q2-3H during shift    The clinical goals for the shift include stable vital signs      Problem: Postpartum  Goal: Experiences normal postpartum course  Outcome: Progressing  Goal: Appropriate maternal -  bonding  Outcome: Progressing  Goal: Establish and maintain infant feeding pattern for adequate nutrition  Outcome: Progressing  Goal: No s/sx infection  Outcome: Progressing  Goal: No s/sx of hemorrhage  Outcome: Progressing  Goal: Minimal s/sx of HDP and BP<160/110  Outcome: Progressing     Problem: Pain - Adult  Goal: Verbalizes/displays adequate comfort level or baseline comfort level  Outcome: Progressing     Problem: Safety - Adult  Goal: Free from fall injury  Outcome: Progressing

## 2023-12-01 NOTE — PROGRESS NOTES
Postpartum Progress Note    Assessment/Plan   Byron Luis is a 23 y.o., , who delivered at 39w1d gestation    Now PPD#1 s/p Vaginal, Spontaneous  on 2023   - Continue routine postpartum care  - Pain well controlled on po medications  - DVT risk score DVT Score: 5 , ppx with lovenox  - Hgb:   Results from last 7 days   Lab Units 23   HEMOGLOBIN g/dL 11.7*      Maternal Well-Being  - Vitals stable  - All questions and concerns address    [place] Feeding  - Breastfeeding/pumping encouraged  - Lactation consult prn    Contraception  - Nexplanon  - Education provided    Dispo  - Anticipate d/c on PPD #2 if meeting all postpartum milestones  Follow up in 4-6 wk for postpartum visit with your OB provider.     LAZARA Fuentes     Principal Problem:    Labor and delivery, indication for care    Pregnancy Problems (from 10/24/23 to present)       Problem Noted Resolved    Labor and delivery, indication for care 2023 by Jackie Goodrich PA-C No    Priority:  Medium      Limited prenatal care in third trimester 2023 by ERMA Tang No    Priority:  Medium      Overview Signed 2023  1:16 PM by ERMA Tang     [facility] referral made   Assessed barriers to transportation, pt notes she was previously able to get to appointments but had trouble accessing these last few times         Uterine size-date discrepancy in third trimester 2023 by ERMA Tang No    Priority:  Medium      Overview Addendum 2023  7:15 PM by ERMA Tang     Growth US ordered    US. Increased interval fetal growth. The ACis at the >99%ile (+ 2.3 SD) and the EFW is at the 96%ile             History of postpartum depression 10/24/2023 by LAZARA Block No    Priority:  Medium      History of suicide attempt 10/24/2023 by LAZARA Block No    Priority:  Medium      Bipolar 1 disorder (CMS/HCC) 10/14/2023 by Shila  ERMA Bucther No    Priority:  Medium      Mild cannabis use disorder 6/2/2022 by Nelia Maddox No    Priority:  Medium      36 weeks gestation of pregnancy 11/9/2023 by ERMA Tang 11/11/2023 by ERMA Rajan    Late prenatal care 10/14/2023 by Nelia Maddox 11/11/2023 by ERMA Rajan          Hospital course: no complications  Vaginal Birth  Patient is currently breastfeedingThe patient's blood type is O POS. The baby's blood type is O POS . Rhogam is not indicated.    Subjective   Her pain is well controlled with current medications  She is passing flatus  She is ambulating well  She is tolerating a Adult diet Regular  She reports no breast or nursing problems  She denies emotional concerns today   Her plan for contraception is Nexplanon     Byron Luis is PPD#1 s/p vaginal delivery who reports feeling overall well.  No acute events overnight.  Voiding spontaneously, passing flatus.  Pain well controlled on PO meds.  Light lochia. Tolerating diet.       Objective   Allergies:   Red dye         Last Vitals:  Temp Pulse Resp BP MAP Pulse Ox   36.2 °C (97.2 °F) 77 16 114/73   99 %     Vitals Min/Max Last 24 Hours:  Temp  Min: 36.1 °C (97 °F)  Max: 36.8 °C (98.2 °F)  Pulse  Min: 69  Max: 83  Resp  Min: 16  Max: 18  BP  Min: 101/63  Max: 119/70    Intake/Output:   No intake or output data in the 24 hours ending 12/01/23 1518    Physical Exam:  General: examination reveals a well developed, well nourished, female, in no acute distress. She is alert and cooperative.  HEENT: external ears normal. Nose normal, no erythema or discharge.  Neck: supple, no significant adenopathy  Lungs: breathing even and unlabored, lungs clear  Cardiac: warm and well perfused, heart rate regular  Fundus: firm and below umbilicus, lochia light  Abdominal: soft, non-tender, non-distended, bowel sounds active  Extremities: no redness or tenderness in the calves or  thighs, no edema.  Neurological: alert, oriented, normal speech, no focal findings or movement disorder noted.  Psychological: awake and alert; oriented to person, place, and time.  Skin: no rashes or lesions    Lab Data:  Labs in chart were reviewed.

## 2023-12-02 VITALS
SYSTOLIC BLOOD PRESSURE: 122 MMHG | HEART RATE: 90 BPM | WEIGHT: 222.44 LBS | BODY MASS INDEX: 35.75 KG/M2 | HEIGHT: 66 IN | OXYGEN SATURATION: 99 % | RESPIRATION RATE: 20 BRPM | DIASTOLIC BLOOD PRESSURE: 74 MMHG | TEMPERATURE: 97.7 F

## 2023-12-02 PROCEDURE — 2500000001 HC RX 250 WO HCPCS SELF ADMINISTERED DRUGS (ALT 637 FOR MEDICARE OP): Performed by: MIDWIFE

## 2023-12-02 RX ADMIN — IBUPROFEN 600 MG: 600 TABLET, FILM COATED ORAL at 10:53

## 2023-12-02 RX ADMIN — IBUPROFEN 600 MG: 600 TABLET, FILM COATED ORAL at 04:14

## 2023-12-02 RX ADMIN — ACETAMINOPHEN 975 MG: 325 TABLET ORAL at 10:53

## 2023-12-02 RX ADMIN — ACETAMINOPHEN 975 MG: 325 TABLET ORAL at 04:14

## 2023-12-02 ASSESSMENT — PAIN SCALES - GENERAL
PAINLEVEL_OUTOF10: 6
PAINLEVEL_OUTOF10: 0 - NO PAIN
PAINLEVEL_OUTOF10: 6

## 2023-12-02 NOTE — DISCHARGE SUMMARY
Discharge Summary    Admission Date: 11/29/2023  Discharge Date: 12/02/23     Discharge Diagnosis  Labor and delivery, indication for care    Hospital Course  Delivery Date: 11/30/2023  9:09 AM   Delivery type: Vaginal, Spontaneous    GA at delivery: 39w1d  Outcome: Living   Anesthesia during delivery: Epidural   Intrapartum complications:    Feeding method: Breastfeeding Status: Yes     Procedures:  Nexplanon insertion  Contraception at discharge: Nexplanon    Pertinent Physical Exam At Time of Discharge    General: Examination reveals a well developed, well nourished, female, in no acute distress. She is alert and cooperative.  Lungs: symmetrical, non-labored breathing.  Cardiac: warm, well-perfused.  Abdomen: soft, non-tender.  Fundus: firm, at umbilicus, and nontender.  Extremities: no redness or tenderness in the calves or thighs.  Neurological: alert, oriented, normal speech, no focal findings or movement disorder noted.     Vitals:    12/02/23 0849   BP: 122/74   Pulse: 90   Resp: 20   Temp: 36.5 °C (97.7 °F)   SpO2: 99%        Discharge Meds     Your medication list        START taking these medications        Instructions Last Dose Given Next Dose Due   acetaminophen 325 mg tablet  Commonly known as: Tylenol      Take 3 tablets (975 mg) by mouth every 6 hours if needed for moderate pain (4 - 6).       docusate sodium 100 mg capsule  Commonly known as: Colace      Take 1 capsule (100 mg) by mouth 2 times a day as needed for constipation.        mg tablet  Generic drug: ibuprofen      Take 1 tablet (600 mg) by mouth every 6 hours if needed for moderate pain (4 - 6) (pain).                 Where to Get Your Medications        These medications were sent to [facility] [id number]      Hours: 8:30 AM to 5 PM Mon-Fri Phone: [phone]   acetaminophen 325 mg tablet  docusate sodium 100 mg capsule   mg tablet          Complications Requiring  Follow-Up  None    Test Results Pending At Discharge  Pending Labs       Order Current Status    Extra Tubes Preliminary result    SST TOP Preliminary result            Outpatient Follow-Up  Future Appointments   Date Time Provider Department Center   12/11/2023 12:00 PM ERMA Rajan [id number]   12/18/2023 12:00 PM ERMA Rajan [id number]       I spent 20 minutes in the professional and overall care of this patient.      JOEY Chand-CNP

## 2023-12-02 NOTE — CARE PLAN
The patient's goals for the shift include pt will pump 2-3 times  during shift    The clinical goals for the shift include pt will have VSS

## 2023-12-02 NOTE — CARE PLAN
Problem: Postpartum  Goal: Experiences normal postpartum course  Outcome: Met  Goal: Appropriate maternal -  bonding  Outcome: Met  Goal: Establish and maintain infant feeding pattern for adequate nutrition  Outcome: Met  Goal: No s/sx infection  Outcome: Met  Goal: No s/sx of hemorrhage  Outcome: Met  Goal: Minimal s/sx of HDP and BP<160/110  Outcome: Met       VSS, bleeding and swelling minimal, pain controlled with medications, formula feeding

## 2023-12-04 ENCOUNTER — APPOINTMENT (OUTPATIENT)
Dept: OBSTETRICS AND GYNECOLOGY | Facility: CLINIC | Age: 23
End: 2023-12-04
Payer: MEDICAID

## 2023-12-04 NOTE — SIGNIFICANT EVENT
12/04/23 2248   Follow Up Phone Call   Do you have questions about your home instructions? No   Did the nurse use a  to talk to you about your discharge instructions? Not applicable   Were you able to fill all of your prescriptions? Yes   Do you have questions about your medication instructions? No   Do you know your follow up appointments? No   If you had home services arranged have they begun? Not applicable   If you had equipment ordered for home, did it arrive? Not applicable   Do you know who to call with worsening symptoms? Yes   It is very important to us that we are sensitive to all of your needs and we are responsive to your complaints and concerns. Do you have any additional questions I can assist you with? No     Follow-up Phone Call Note:   Interview:  Care Type: Women's Health   Phone Number Call: [phone]   Call Outcome: connected with patient   Patient Reports Feeling (symptoms) Are: better   Patient Has a Primary Care Provider: yes   Post-hospitalization Follow-up Occurred According To Schedule: no   Reason:  appointment not scheduled, encouraged patient to call     Delivered Baby(ies): yes   Chest Pain: no     Shortness of breath or difficulty breathing: no   Seizures: no   Any thoughts of hurting yourself or your baby: no   Bleeding that is soaking through one pad/hour or blood clots the size of an egg or bigger: no   Incision that is not healing:  no   Red or swollen leg that is painful or warm to the touch: no   Temperature of 100.4F or higher: no   Headache that does not get better, even after taking medicine, or a bad headache with vision changes: no   Where or in what is your baby sleeping?:  pack n' play   ABC's of sleep covered:  yes   How Are You Feeding Your Baby(ies): expressed breast milk and formula   Baby Getting Anything Other Than Breastmilk Or Formula For Food:  no   Patient Has Primary Care Provider For Baby(ies): yes   Baby Has Been Seen By a Health Care Provider  Since Discharge:  no, appointment scheduled for tomorrow   Comments:   Encouraged patient to call outpatient lactation as needed for an appointment or information on a support group.          Date/Time Of Call: 12/4/23 @ 0718   Call Back Done By: care coordinator   Callback Complete:  Yes

## 2023-12-11 ENCOUNTER — APPOINTMENT (OUTPATIENT)
Dept: OBSTETRICS AND GYNECOLOGY | Facility: CLINIC | Age: 23
End: 2023-12-11
Payer: MEDICAID

## 2023-12-13 LAB — HOLD SPECIMEN: NORMAL

## 2023-12-18 ENCOUNTER — TELEPHONE (OUTPATIENT)
Dept: OBSTETRICS AND GYNECOLOGY | Facility: HOSPITAL | Age: 23
End: 2023-12-18
Payer: MEDICAID

## 2023-12-18 ENCOUNTER — APPOINTMENT (OUTPATIENT)
Dept: OBSTETRICS AND GYNECOLOGY | Facility: CLINIC | Age: 23
End: 2023-12-18
Payer: MEDICAID

## 2023-12-18 RX ORDER — FLUOXETINE HYDROCHLORIDE 40 MG/1
40 CAPSULE ORAL DAILY
Qty: 30 CAPSULE | Refills: 1 | Status: SHIPPED | OUTPATIENT
Start: 2023-12-18 | End: 2024-12-17

## 2023-12-18 NOTE — TELEPHONE ENCOUNTER
Patient called office to report that she believes she is starting to have post partum depression  Patient delivered on 11/30/23  She states she is in the process of scheduling appointment with a counselor but has previously been on Prozac and would like this sent in if possible  Message sent to Shila to advise  States she would like her to come in for a follow up appointment as soon as possible  Patient unable to go to [place] today to see her, scheduled with Dr. Fernandez 12/22  Prozac sent in to patient's pharmacy  Patient aware  Encouraged patient to call office with any questions or concerns  Riri Hightower RN

## 2023-12-22 ENCOUNTER — TELEPHONE (OUTPATIENT)
Dept: OBSTETRICS AND GYNECOLOGY | Facility: HOSPITAL | Age: 23
End: 2023-12-22
Payer: MEDICAID

## 2023-12-22 NOTE — TELEPHONE ENCOUNTER
Patient no showed appointment today for follow up regarding PPD  Patient states she wasn't able to make it today but she is feeling better, has picked up her medication and has a counseling appointment scheduled for next week  PPV scheduled  Encouraged patient to call office with any concerns  Riri Hightower RN

## 2024-01-13 PROBLEM — O26.843 UTERINE SIZE-DATE DISCREPANCY IN THIRD TRIMESTER (HHS-HCC): Status: RESOLVED | Noted: 2023-11-09 | Resolved: 2024-01-13

## 2024-01-13 PROBLEM — F12.91 HISTORY OF MARIJUANA USE: Status: RESOLVED | Noted: 2023-11-09 | Resolved: 2024-01-13

## 2024-01-13 PROBLEM — Z22.330 GBS CARRIER: Status: RESOLVED | Noted: 2023-11-13 | Resolved: 2024-01-13

## 2024-01-13 PROBLEM — Z34.80 SUPERVISION OF OTHER NORMAL PREGNANCY, ANTEPARTUM (HHS-HCC): Status: RESOLVED | Noted: 2023-10-14 | Resolved: 2024-01-13

## 2024-01-13 PROBLEM — O09.33 LIMITED PRENATAL CARE IN THIRD TRIMESTER (HHS-HCC): Status: RESOLVED | Noted: 2023-11-09 | Resolved: 2024-01-13

## 2024-01-13 PROBLEM — N89.8 VAGINAL IRRITATION: Status: RESOLVED | Noted: 2023-10-14 | Resolved: 2024-01-13

## 2024-05-08 ENCOUNTER — OFFICE VISIT (OUTPATIENT)
Dept: OBSTETRICS AND GYNECOLOGY | Facility: HOSPITAL | Age: 24
End: 2024-05-08
Payer: MEDICAID

## 2024-05-08 VITALS
BODY MASS INDEX: 32.14 KG/M2 | WEIGHT: 200 LBS | DIASTOLIC BLOOD PRESSURE: 63 MMHG | HEIGHT: 66 IN | SYSTOLIC BLOOD PRESSURE: 100 MMHG

## 2024-05-08 DIAGNOSIS — Z97.5 NEXPLANON IN PLACE: ICD-10-CM

## 2024-05-08 DIAGNOSIS — Z01.419 GYNECOLOGIC EXAM NORMAL: Primary | ICD-10-CM

## 2024-05-08 DIAGNOSIS — N89.8 VAGINAL DISCHARGE: ICD-10-CM

## 2024-05-08 DIAGNOSIS — Z11.3 SCREENING EXAMINATION FOR STI: ICD-10-CM

## 2024-05-08 PROBLEM — F12.10 MILD CANNABIS USE DISORDER: Status: RESOLVED | Noted: 2022-06-02 | Resolved: 2024-05-08

## 2024-05-08 PROBLEM — F31.9 BIPOLAR 1 DISORDER (MULTI): Status: RESOLVED | Noted: 2023-10-14 | Resolved: 2024-05-08

## 2024-05-08 PROCEDURE — 87491 CHLMYD TRACH DNA AMP PROBE: CPT | Performed by: ADVANCED PRACTICE MIDWIFE

## 2024-05-08 PROCEDURE — 99395 PREV VISIT EST AGE 18-39: CPT | Performed by: ADVANCED PRACTICE MIDWIFE

## 2024-05-08 PROCEDURE — 87205 SMEAR GRAM STAIN: CPT | Performed by: ADVANCED PRACTICE MIDWIFE

## 2024-05-08 PROCEDURE — 1036F TOBACCO NON-USER: CPT | Performed by: ADVANCED PRACTICE MIDWIFE

## 2024-05-08 PROCEDURE — 87661 TRICHOMONAS VAGINALIS AMPLIF: CPT | Performed by: ADVANCED PRACTICE MIDWIFE

## 2024-05-08 SDOH — ECONOMIC STABILITY: FOOD INSECURITY: WITHIN THE PAST 12 MONTHS, THE FOOD YOU BOUGHT JUST DIDN'T LAST AND YOU DIDN'T HAVE MONEY TO GET MORE.: NEVER TRUE

## 2024-05-08 SDOH — ECONOMIC STABILITY: FOOD INSECURITY: WITHIN THE PAST 12 MONTHS, YOU WORRIED THAT YOUR FOOD WOULD RUN OUT BEFORE YOU GOT MONEY TO BUY MORE.: NEVER TRUE

## 2024-05-08 ASSESSMENT — ENCOUNTER SYMPTOMS
BACK PAIN: 1
LOSS OF SENSATION IN FEET: 0
OCCASIONAL FEELINGS OF UNSTEADINESS: 0
DEPRESSION: 0

## 2024-05-08 ASSESSMENT — PATIENT HEALTH QUESTIONNAIRE - PHQ9
2. FEELING DOWN, DEPRESSED OR HOPELESS: NOT AT ALL
1. LITTLE INTEREST OR PLEASURE IN DOING THINGS: NOT AT ALL
SUM OF ALL RESPONSES TO PHQ9 QUESTIONS 1 & 2: 0

## 2024-05-08 ASSESSMENT — SOCIAL DETERMINANTS OF HEALTH (SDOH)

## 2024-05-08 NOTE — PROGRESS NOTES
"Subjective   Byron Luis is a 24 y.o. female who is here for an annual gyn exam.     Concerns:   Vaginal \"fishy\" odor and white discharge started two weeks ago. No burning pain, itching, increased frequency, and urgency.     Back pain for past five months. Believes it is caused by epidural from 2023. Denies fever, chills, erythema, and swelling.     Complains of excessive sweating that she has been experiencing since childhood. It is bothersome and has a strong odor.     Happy with Nexplanon that was inserted after birth.     LMP: 2024  Periods are irregular, lasting 7 days.   Dysmenorrhea:severe, occurring first 3 days of flow.   Cyclic symptoms include  abdominal cramps . No intermenstrual bleeding, spotting, or discharge.    Last pap: 2022, WNL   History of abnormal Pap smear: no   STI testing: yes today   HPV vaccination: Yes x3     SoHx:  Vaginal hygiene: practices good vaginal hygiene. Avoids soaps and irritative products.   Loss of sexual desire: No  Pain with sex: No  Able to orgasm: Yes   Living Situation: Stable living situation with kids.   Safe at home: Yes   School/Employment: part time at [employer].  Exercise: No     Substance:   Tobacco Use: Low Risk  (2024)    Patient History     Smoking Tobacco Use: Never     Smokeless Tobacco Use: Never     Passive Exposure: Not on file      Social History     Substance and Sexual Activity   Drug Use Yes    Types: Marijuana      Social History     Substance and Sexual Activity   Alcohol Use Yes       Social History     Substance and Sexual Activity   Sexual Activity Yes    Partners: Male     OB History          2    Para   2    Term   2            AB        Living   2         SAB        IAB        Ectopic        Multiple   0    Live Births   2               Past Medical History:   Diagnosis Date    Asthma ([facility])     Never intubated or hospitalized for asthma. Does not use inhalar    Bipolar 1 disorder (Multi)  " "   Hx of trichomoniasis      History reviewed. No pertinent surgical history.  Family History   Problem Relation Name Age of Onset    Coronary artery disease Mother          Early    Hypertrophic cardiomyopathy Father      Hypertrophic cardiomyopathy Brother      Cervical cancer Neg Hx      Breast cancer Neg Hx      Colon cancer Neg Hx      Pancreatic cancer Neg Hx         Review of Systems   Genitourinary:  Positive for vaginal discharge.   Musculoskeletal:  Positive for back pain.   All other systems reviewed and are negative.      Objective   /63   Ht 1.676 m (5' 6\")   Wt 90.7 kg (200 lb)   BMI 32.28 kg/m²     Physical Exam  Constitutional:       Appearance: Normal appearance.   HENT:      Head: Normocephalic and atraumatic.   Cardiovascular:      Rate and Rhythm: Normal rate.   Pulmonary:      Effort: Pulmonary effort is normal.   Genitourinary:     Comments: Exam declined, opted to self-swab for BV  Skin:     General: Skin is warm and dry.   Neurological:      General: No focal deficit present.      Mental Status: She is alert and oriented to person, place, and time.   Psychiatric:         Mood and Affect: Mood normal.         Behavior: Behavior normal.         Thought Content: Thought content normal.         Judgment: Judgment normal.         Assessment/Plan   Problem List Items Addressed This Visit       Gynecologic exam normal - Primary    Overview     UTD on pap; needs pap 5/25  STI testing  Nexplanon in place  HPV x3          Other Visit Diagnoses       Screening examination for STI        Relevant Orders    C. Trachomatis / N. Gonorrhoeae, Amplified Detection    Hepatitis B Surface Antigen    Hepatitis C Antibody    HIV 1/2 Antigen/Antibody Screen with Reflex to Confirmation    Syphilis Screen with Reflex    Trichomonas vaginalis, Nucleic Acid Detection    Vaginal discharge        Relevant Orders    Vaginitis Gram Stain For Bacterial Vaginosis + Yeast            Plan:   - Discussed supportive " measures for back pain; encouraged patient to follow up with PCP if this persists  -encouraged patient to follow up with PCP re sweating ; that there is no specific treatment for that that I know of   -encouraged healthy eating, exercise recommendations based of off  min of moderate intensity exercise a week  -RTC in 1  year for next annual or prn    Felisa KEENES     I was present with the PA student who participated in the documentation of this note. I have personally seen and re-examined the patient and performed the medical decision-making components (assessment and plan of care). I have reviewed the PA student documentation and verified the findings in the note as written with additions or exceptions as stated in the body of this note.    JOEY Rajan-MANSI

## 2024-05-09 LAB
C TRACH RRNA SPEC QL NAA+PROBE: NEGATIVE
CLUE CELLS VAG LPF-#/AREA: NORMAL /[LPF]
N GONORRHOEA DNA SPEC QL PROBE+SIG AMP: NEGATIVE
NUGENT SCORE: 1
T VAGINALIS RRNA SPEC QL NAA+PROBE: NEGATIVE
YEAST VAG WET PREP-#/AREA: NORMAL

## 2024-07-01 ENCOUNTER — APPOINTMENT (OUTPATIENT)
Dept: OBSTETRICS AND GYNECOLOGY | Facility: CLINIC | Age: 24
End: 2024-07-01
Payer: MEDICAID

## 2024-08-16 ENCOUNTER — APPOINTMENT (OUTPATIENT)
Dept: OBSTETRICS AND GYNECOLOGY | Facility: CLINIC | Age: 24
End: 2024-08-16
Payer: MEDICAID

## 2024-08-30 ENCOUNTER — OFFICE VISIT (OUTPATIENT)
Dept: OBSTETRICS AND GYNECOLOGY | Facility: CLINIC | Age: 24
End: 2024-08-30
Payer: MEDICAID

## 2024-08-30 VITALS
WEIGHT: 193 LBS | DIASTOLIC BLOOD PRESSURE: 70 MMHG | BODY MASS INDEX: 31.15 KG/M2 | SYSTOLIC BLOOD PRESSURE: 119 MMHG | HEART RATE: 93 BPM

## 2024-08-30 DIAGNOSIS — Z11.3 ROUTINE SCREENING FOR STI (SEXUALLY TRANSMITTED INFECTION): Primary | ICD-10-CM

## 2024-08-30 DIAGNOSIS — R07.9 CHEST PAIN, UNSPECIFIED TYPE: ICD-10-CM

## 2024-08-30 DIAGNOSIS — L73.1 INGROWN HAIR: ICD-10-CM

## 2024-08-30 DIAGNOSIS — N89.8 VAGINAL DISCHARGE: ICD-10-CM

## 2024-08-30 LAB
POC APPEARANCE, URINE: CLEAR
POC BILIRUBIN, URINE: NEGATIVE
POC BLOOD, URINE: NEGATIVE
POC COLOR, URINE: YELLOW
POC GLUCOSE, URINE: NEGATIVE MG/DL
POC KETONES, URINE: NEGATIVE MG/DL
POC LEUKOCYTES, URINE: NEGATIVE
POC NITRITE,URINE: NEGATIVE
POC PH, URINE: 6 PH
POC PROTEIN, URINE: NEGATIVE MG/DL
POC SPECIFIC GRAVITY, URINE: >=1.03
POC UROBILINOGEN, URINE: 0.2 EU/DL

## 2024-08-30 PROCEDURE — 87661 TRICHOMONAS VAGINALIS AMPLIF: CPT

## 2024-08-30 PROCEDURE — 87205 SMEAR GRAM STAIN: CPT

## 2024-08-30 PROCEDURE — 81003 URINALYSIS AUTO W/O SCOPE: CPT

## 2024-08-30 PROCEDURE — 99214 OFFICE O/P EST MOD 30 MIN: CPT | Mod: GC

## 2024-08-30 PROCEDURE — 1036F TOBACCO NON-USER: CPT

## 2024-08-30 PROCEDURE — 87591 N.GONORRHOEAE DNA AMP PROB: CPT

## 2024-08-30 PROCEDURE — 99214 OFFICE O/P EST MOD 30 MIN: CPT

## 2024-08-30 PROCEDURE — 87491 CHLMYD TRACH DNA AMP PROBE: CPT

## 2024-08-30 NOTE — LETTER
August 30, 2024     Patient: Byron Luis   YOB: 2000   Date of Visit: 8/30/2024       To Whom It May Concern:    Byron Luis was seen in my clinic on 8/30/2024 at 8:45 am. Please excuse Byron for her absence from work on this day to make the appointment.    If you have any questions or concerns, please don't hesitate to call.         Sincerely,   Jermaine Garcia MD

## 2024-08-30 NOTE — PROGRESS NOTES
Subjective   Patient ID: Byron Luis is a 24 y.o. female who presents for abnml vaginal discharge.   Feeling discomfort in her vagina, mainly after showering. Has been coming and going. Having a thick, white discharge for the past two days that has an unusual order. Denies dysuria, hematuria. Sexually with multiple activity partners w/o barrier protection. Denies post coital bleeding and dyspareunia. Desires STI testing. Using Nexplanon for contraception.     Reports having bumps on mons pubis that are concerning to her. Reports shaving in the past but now she waxes.       Review of Systems   All other systems reviewed and are negative.      Objective   Physical Exam  Constitutional:       Appearance: Normal appearance.   HENT:      Head: Atraumatic.   Eyes:      Extraocular Movements: Extraocular movements intact.   Cardiovascular:      Rate and Rhythm: Normal rate and regular rhythm.   Pulmonary:      Effort: Pulmonary effort is normal.      Comments: Lungs CTAB in all lung fields   Genitourinary:     General: Normal vulva.      Comments: White discharge in vaginal vault, cervix grossly normal in appearance, in grown hair bumps on mons pubis  Musculoskeletal:         General: Normal range of motion.      Cervical back: Normal range of motion.   Skin:     General: Skin is warm.   Neurological:      General: No focal deficit present.   Psychiatric:         Mood and Affect: Mood normal.         Assessment/Plan   Problem List Items Addressed This Visit             ICD-10-CM    Chest pain R07.9     Referral to PCP placed         Relevant Orders    Referral to Primary Care    Ingrown hair L73.1     Encouraged to stop shaving and continue waxing for prevention of ingrown hair bumps on mons pubis          Vaginal discharge N89.8     STI screening and vaginitis swabs collected   Encouraged condom use for prevention of STIs          Relevant Orders    Vaginitis Gram Stain For Bacterial Vaginosis + Yeast    POCT UA  Automated manually resulted (Completed)     Other Visit Diagnoses         Codes    Routine screening for STI (sexually transmitted infection)    -  Primary Z11.3    Relevant Orders    Trichomonas vaginalis, Nucleic Acid Detection    Chlamydia Trachomatis, Amplified    Neisseria gonorrhoeae, Amplified    HIV 1/2 Antigen/Antibody Screen with Reflex to Confirmation    Syphilis Screen with Reflex    Hepatitis C Antibody    Hepatitis B Surface Antigen          Seen and d/w Dr. Dionicio Garcia MD, PGY-3  RTC prn

## 2024-08-30 NOTE — ASSESSMENT & PLAN NOTE
Encouraged to stop shaving and continue waxing for prevention of ingrown hair bumps on mons pubis

## 2024-09-12 ENCOUNTER — HOSPITAL ENCOUNTER (EMERGENCY)
Facility: HOSPITAL | Age: 24
Discharge: HOME | End: 2024-09-12
Payer: MEDICAID

## 2024-09-12 ENCOUNTER — APPOINTMENT (OUTPATIENT)
Dept: RADIOLOGY | Facility: HOSPITAL | Age: 24
End: 2024-09-12
Payer: MEDICAID

## 2024-09-12 VITALS
HEIGHT: 66 IN | BODY MASS INDEX: 30.53 KG/M2 | DIASTOLIC BLOOD PRESSURE: 76 MMHG | SYSTOLIC BLOOD PRESSURE: 122 MMHG | OXYGEN SATURATION: 100 % | RESPIRATION RATE: 18 BRPM | HEART RATE: 84 BPM | WEIGHT: 190 LBS | TEMPERATURE: 96.3 F

## 2024-09-12 DIAGNOSIS — R07.89 CHEST WALL PAIN: Primary | ICD-10-CM

## 2024-09-12 PROCEDURE — 71046 X-RAY EXAM CHEST 2 VIEWS: CPT | Mod: FOREIGN READ | Performed by: RADIOLOGY

## 2024-09-12 PROCEDURE — 99283 EMERGENCY DEPT VISIT LOW MDM: CPT

## 2024-09-12 PROCEDURE — 2500000001 HC RX 250 WO HCPCS SELF ADMINISTERED DRUGS (ALT 637 FOR MEDICARE OP): Performed by: NURSE PRACTITIONER

## 2024-09-12 PROCEDURE — 71046 X-RAY EXAM CHEST 2 VIEWS: CPT

## 2024-09-12 PROCEDURE — 2500000002 HC RX 250 W HCPCS SELF ADMINISTERED DRUGS (ALT 637 FOR MEDICARE OP, ALT 636 FOR OP/ED): Performed by: NURSE PRACTITIONER

## 2024-09-12 RX ORDER — TIZANIDINE 2 MG/1
4 TABLET ORAL 2 TIMES DAILY
Qty: 56 TABLET | Refills: 0 | Status: SHIPPED | OUTPATIENT
Start: 2024-09-12 | End: 2024-09-26

## 2024-09-12 RX ORDER — NAPROXEN 500 MG/1
500 TABLET ORAL ONCE
Status: COMPLETED | OUTPATIENT
Start: 2024-09-12 | End: 2024-09-12

## 2024-09-12 RX ORDER — TIZANIDINE 4 MG/1
4 TABLET ORAL ONCE
Status: COMPLETED | OUTPATIENT
Start: 2024-09-12 | End: 2024-09-12

## 2024-09-12 RX ORDER — NAPROXEN 500 MG/1
500 TABLET ORAL
Qty: 20 TABLET | Refills: 0 | Status: SHIPPED | OUTPATIENT
Start: 2024-09-12 | End: 2024-09-22

## 2024-09-12 RX ADMIN — TIZANIDINE 4 MG: 4 TABLET ORAL at 09:35

## 2024-09-12 RX ADMIN — NAPROXEN 500 MG: 500 TABLET ORAL at 09:35

## 2024-09-12 ASSESSMENT — COLUMBIA-SUICIDE SEVERITY RATING SCALE - C-SSRS
6. HAVE YOU EVER DONE ANYTHING, STARTED TO DO ANYTHING, OR PREPARED TO DO ANYTHING TO END YOUR LIFE?: NO
1. IN THE PAST MONTH, HAVE YOU WISHED YOU WERE DEAD OR WISHED YOU COULD GO TO SLEEP AND NOT WAKE UP?: NO
2. HAVE YOU ACTUALLY HAD ANY THOUGHTS OF KILLING YOURSELF?: NO

## 2024-09-12 ASSESSMENT — PAIN DESCRIPTION - LOCATION: LOCATION: CHEST

## 2024-09-12 ASSESSMENT — PAIN DESCRIPTION - DESCRIPTORS
DESCRIPTORS: ACHING;PRESSURE
DESCRIPTORS: ACHING;PRESSURE

## 2024-09-12 ASSESSMENT — PAIN - FUNCTIONAL ASSESSMENT: PAIN_FUNCTIONAL_ASSESSMENT: 0-10

## 2024-09-12 ASSESSMENT — PAIN DESCRIPTION - PAIN TYPE: TYPE: ACUTE PAIN

## 2024-09-12 ASSESSMENT — PAIN DESCRIPTION - FREQUENCY: FREQUENCY: INTERMITTENT

## 2024-09-12 ASSESSMENT — PAIN DESCRIPTION - ORIENTATION: ORIENTATION: LEFT

## 2024-09-12 ASSESSMENT — PAIN DESCRIPTION - ONSET: ONSET: SUDDEN

## 2024-09-12 ASSESSMENT — PAIN DESCRIPTION - PROGRESSION: CLINICAL_PROGRESSION: NOT CHANGED

## 2024-09-12 ASSESSMENT — PAIN SCALES - GENERAL: PAINLEVEL_OUTOF10: 9
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
## 2024-09-12 NOTE — Clinical Note
Byron Luis was seen and treated in our emergency department on 9/12/2024.  She may return to work on 09/13/2024.       If you have any questions or concerns, please don't hesitate to call.      Luis Lamb, APRN-CNP

## 2025-01-21 ENCOUNTER — APPOINTMENT (OUTPATIENT)
Dept: OBSTETRICS AND GYNECOLOGY | Facility: HOSPITAL | Age: 25
End: 2025-01-21
Payer: MEDICAID

## 2025-06-13 ENCOUNTER — HOSPITAL ENCOUNTER (EMERGENCY)
Facility: HOSPITAL | Age: 25
Discharge: HOME | End: 2025-06-13
Payer: MEDICAID

## 2025-06-13 VITALS
RESPIRATION RATE: 18 BRPM | DIASTOLIC BLOOD PRESSURE: 68 MMHG | TEMPERATURE: 98.1 F | BODY MASS INDEX: 30.61 KG/M2 | SYSTOLIC BLOOD PRESSURE: 128 MMHG | HEART RATE: 87 BPM | WEIGHT: 195 LBS | OXYGEN SATURATION: 98 % | HEIGHT: 67 IN

## 2025-06-13 DIAGNOSIS — R10.84 ABDOMINAL PAIN, GENERALIZED: Primary | ICD-10-CM

## 2025-06-13 DIAGNOSIS — R68.2 DRY MOUTH: ICD-10-CM

## 2025-06-13 DIAGNOSIS — R11.0 NAUSEA: ICD-10-CM

## 2025-06-13 DIAGNOSIS — R51.9 ACUTE NONINTRACTABLE HEADACHE, UNSPECIFIED HEADACHE TYPE: ICD-10-CM

## 2025-06-13 LAB
ALBUMIN SERPL BCP-MCNC: 4.3 G/DL (ref 3.4–5)
ALP SERPL-CCNC: 78 U/L (ref 33–110)
ALT SERPL W P-5'-P-CCNC: 15 U/L (ref 7–45)
ANION GAP SERPL CALC-SCNC: 10 MMOL/L (ref 10–20)
AST SERPL W P-5'-P-CCNC: 14 U/L (ref 9–39)
B-HCG SERPL-ACNC: <2 MIU/ML
BASOPHILS # BLD AUTO: 0.03 X10*3/UL (ref 0–0.1)
BASOPHILS NFR BLD AUTO: 0.6 %
BILIRUB SERPL-MCNC: 0.3 MG/DL (ref 0–1.2)
BUN SERPL-MCNC: 8 MG/DL (ref 6–23)
CALCIUM SERPL-MCNC: 9.1 MG/DL (ref 8.6–10.3)
CHLORIDE SERPL-SCNC: 105 MMOL/L (ref 98–107)
CO2 SERPL-SCNC: 27 MMOL/L (ref 21–32)
CREAT SERPL-MCNC: 0.68 MG/DL (ref 0.5–1.05)
EGFRCR SERPLBLD CKD-EPI 2021: >90 ML/MIN/1.73M*2
EOSINOPHIL # BLD AUTO: 0.12 X10*3/UL (ref 0–0.7)
EOSINOPHIL NFR BLD AUTO: 2.3 %
ERYTHROCYTE [DISTWIDTH] IN BLOOD BY AUTOMATED COUNT: 12.1 % (ref 11.5–14.5)
GLUCOSE SERPL-MCNC: 84 MG/DL (ref 74–99)
HCT VFR BLD AUTO: 37.5 % (ref 36–46)
HGB BLD-MCNC: 12.2 G/DL (ref 12–16)
IMM GRANULOCYTES # BLD AUTO: 0.01 X10*3/UL (ref 0–0.7)
IMM GRANULOCYTES NFR BLD AUTO: 0.2 % (ref 0–0.9)
LIPASE SERPL-CCNC: 23 U/L (ref 9–82)
LYMPHOCYTES # BLD AUTO: 2.28 X10*3/UL (ref 1.2–4.8)
LYMPHOCYTES NFR BLD AUTO: 43.8 %
MCH RBC QN AUTO: 29.1 PG (ref 26–34)
MCHC RBC AUTO-ENTMCNC: 32.5 G/DL (ref 32–36)
MCV RBC AUTO: 90 FL (ref 80–100)
MONOCYTES # BLD AUTO: 0.34 X10*3/UL (ref 0.1–1)
MONOCYTES NFR BLD AUTO: 6.5 %
NEUTROPHILS # BLD AUTO: 2.42 X10*3/UL (ref 1.2–7.7)
NEUTROPHILS NFR BLD AUTO: 46.6 %
NRBC BLD-RTO: 0 /100 WBCS (ref 0–0)
PLATELET # BLD AUTO: 275 X10*3/UL (ref 150–450)
POTASSIUM SERPL-SCNC: 3.7 MMOL/L (ref 3.5–5.3)
PROT SERPL-MCNC: 7.3 G/DL (ref 6.4–8.2)
RBC # BLD AUTO: 4.19 X10*6/UL (ref 4–5.2)
SODIUM SERPL-SCNC: 138 MMOL/L (ref 136–145)
WBC # BLD AUTO: 5.2 X10*3/UL (ref 4.4–11.3)

## 2025-06-13 PROCEDURE — 83690 ASSAY OF LIPASE: CPT | Performed by: PHYSICIAN ASSISTANT

## 2025-06-13 PROCEDURE — 36415 COLL VENOUS BLD VENIPUNCTURE: CPT | Performed by: PHYSICIAN ASSISTANT

## 2025-06-13 PROCEDURE — 99284 EMERGENCY DEPT VISIT MOD MDM: CPT | Mod: 25

## 2025-06-13 PROCEDURE — 85025 COMPLETE CBC W/AUTO DIFF WBC: CPT | Performed by: PHYSICIAN ASSISTANT

## 2025-06-13 PROCEDURE — 84702 CHORIONIC GONADOTROPIN TEST: CPT | Performed by: PHYSICIAN ASSISTANT

## 2025-06-13 PROCEDURE — 96375 TX/PRO/DX INJ NEW DRUG ADDON: CPT

## 2025-06-13 PROCEDURE — 2500000004 HC RX 250 GENERAL PHARMACY W/ HCPCS (ALT 636 FOR OP/ED): Mod: JZ | Performed by: PHYSICIAN ASSISTANT

## 2025-06-13 PROCEDURE — 96361 HYDRATE IV INFUSION ADD-ON: CPT

## 2025-06-13 PROCEDURE — 80053 COMPREHEN METABOLIC PANEL: CPT | Performed by: PHYSICIAN ASSISTANT

## 2025-06-13 PROCEDURE — 96374 THER/PROPH/DIAG INJ IV PUSH: CPT

## 2025-06-13 RX ORDER — ONDANSETRON 4 MG/1
4 TABLET, ORALLY DISINTEGRATING ORAL EVERY 8 HOURS PRN
Qty: 20 TABLET | Refills: 0 | Status: SHIPPED | OUTPATIENT
Start: 2025-06-13

## 2025-06-13 RX ORDER — KETOROLAC TROMETHAMINE 10 MG/1
10 TABLET, FILM COATED ORAL EVERY 8 HOURS PRN
Qty: 15 TABLET | Refills: 0 | Status: SHIPPED | OUTPATIENT
Start: 2025-06-13 | End: 2025-06-18

## 2025-06-13 RX ORDER — METOCLOPRAMIDE HYDROCHLORIDE 5 MG/ML
5 INJECTION INTRAMUSCULAR; INTRAVENOUS ONCE
Status: COMPLETED | OUTPATIENT
Start: 2025-06-13 | End: 2025-06-13

## 2025-06-13 RX ORDER — KETOROLAC TROMETHAMINE 30 MG/ML
15 INJECTION, SOLUTION INTRAMUSCULAR; INTRAVENOUS ONCE
Status: COMPLETED | OUTPATIENT
Start: 2025-06-13 | End: 2025-06-13

## 2025-06-13 RX ORDER — DIPHENHYDRAMINE HYDROCHLORIDE 50 MG/ML
25 INJECTION, SOLUTION INTRAMUSCULAR; INTRAVENOUS ONCE
Status: COMPLETED | OUTPATIENT
Start: 2025-06-13 | End: 2025-06-13

## 2025-06-13 RX ADMIN — DIPHENHYDRAMINE HYDROCHLORIDE 25 MG: 50 INJECTION, SOLUTION INTRAMUSCULAR; INTRAVENOUS at 16:21

## 2025-06-13 RX ADMIN — KETOROLAC TROMETHAMINE 15 MG: 30 INJECTION, SOLUTION INTRAMUSCULAR at 16:20

## 2025-06-13 RX ADMIN — METOCLOPRAMIDE 5 MG: 5 INJECTION, SOLUTION INTRAMUSCULAR; INTRAVENOUS at 16:20

## 2025-06-13 RX ADMIN — SODIUM CHLORIDE 1000 ML: 0.9 INJECTION, SOLUTION INTRAVENOUS at 15:31

## 2025-06-13 NOTE — LETTER
June 13, 2025    Patient: Byron Luis   YOB: 2000   Date of Visit: 6/13/2025       To Whom It May Concern:    Byron Luis was seen and treated in our emergency department on 6/13/2025. She may return to work on 6/15/2025.    If you have any questions or concerns, please don't hesitate to call.              CC: No Recipients

## 2025-06-14 NOTE — ED PROVIDER NOTES
HPI   Chief Complaint   Patient presents with    Abdominal Pain       This is a pleasant 25-year-old female who has multiple complaints including abdominal pain generalized headache this been ongoing for the past few days not the worst but not completely resolving with over-the-counter's.  She also complains of some nausea and dry mouth.  She is unsure if she is pregnant or not.  No head injury.  No other complaints.  States I think I am dehydrated and requesting IV fluid.              Patient History   Medical History[1]  Surgical History[2]  Family History[3]  Social History[4]    Physical Exam   ED Triage Vitals   Temperature Heart Rate Respirations BP   06/13/25 1425 06/13/25 1426 06/13/25 1426 06/13/25 1426   36.7 °C (98.1 °F) 80 16 123/71      Pulse Ox Temp Source Heart Rate Source Patient Position   06/13/25 1426 06/13/25 1425 06/13/25 1745 06/13/25 1745   100 % Temporal Monitor Sitting      BP Location FiO2 (%)     06/13/25 1745 --     Right arm        Physical Exam  Vitals and nursing note reviewed.   Constitutional:       General: She is not in acute distress.     Appearance: Normal appearance. She is normal weight. She is not ill-appearing, toxic-appearing or diaphoretic.   HENT:      Head: Normocephalic and atraumatic.      Right Ear: External ear normal.      Left Ear: External ear normal.      Nose: Nose normal.   Eyes:      Extraocular Movements: Extraocular movements intact.      Conjunctiva/sclera: Conjunctivae normal.      Pupils: Pupils are equal, round, and reactive to light.   Cardiovascular:      Rate and Rhythm: Normal rate and regular rhythm.   Pulmonary:      Effort: Pulmonary effort is normal. No respiratory distress.      Breath sounds: No stridor.   Abdominal:      General: Bowel sounds are normal. There is no distension.      Palpations: Abdomen is soft.      Tenderness: There is no abdominal tenderness. There is no right CVA tenderness, left CVA tenderness, guarding or rebound.    Musculoskeletal:         General: No swelling, tenderness or deformity.      Cervical back: Normal range of motion.   Skin:     General: Skin is warm.      Capillary Refill: Capillary refill takes less than 2 seconds.      Coloration: Skin is not jaundiced.      Findings: No bruising or rash.   Neurological:      General: No focal deficit present.      Mental Status: She is alert and oriented to person, place, and time. Mental status is at baseline.   Psychiatric:         Mood and Affect: Mood normal.         Behavior: Behavior normal.         Thought Content: Thought content normal.         Judgment: Judgment normal.           ED Course & MDM   Diagnoses as of 06/13/25 2235   Abdominal pain, generalized   Acute nonintractable headache, unspecified headache type   Nausea   Dry mouth                 No data recorded     Mervat Coma Scale Score: 15 (06/13/25 1428 : Cristobal Stark RN)                           Medical Decision Making  Differential diagnosis of migraine versus pregnancy versus hyperglycemia    Ultimately patient appears well nontoxic workup is unremarkable she feels improved with IV fluid.    Risk  OTC drugs.  Prescription drug management.        Procedure  Procedures       [1]   Past Medical History:  Diagnosis Date    Asthma ([facility])     Never intubated or hospitalized for asthma. Does not use inhalar    Bipolar 1 disorder (Multi)     Hx of trichomoniasis    [2] No past surgical history on file.  [3]   Family History  Problem Relation Name Age of Onset    Coronary artery disease Mother          Early    Hypertrophic cardiomyopathy Father      Hypertrophic cardiomyopathy Brother      Cervical cancer Neg Hx      Breast cancer Neg Hx      Colon cancer Neg Hx      Pancreatic cancer Neg Hx     [4]   Social History  Tobacco Use    Smoking status: Never    Smokeless tobacco: Never   Vaping Use    Vaping status: Never Used   Substance Use Topics    Alcohol use: Yes    Drug use: Yes     Types: Marijuana         Carlos Suazo PA-C  06/13/25 1036

## 2025-08-27 ENCOUNTER — APPOINTMENT (OUTPATIENT)
Dept: OBSTETRICS AND GYNECOLOGY | Facility: HOSPITAL | Age: 25
End: 2025-08-27
Payer: MEDICAID

## 2025-09-03 ENCOUNTER — APPOINTMENT (OUTPATIENT)
Dept: CARDIOLOGY | Facility: HOSPITAL | Age: 25
End: 2025-09-03
Payer: MEDICAID

## 2025-09-12 ENCOUNTER — APPOINTMENT (OUTPATIENT)
Dept: OBSTETRICS AND GYNECOLOGY | Facility: HOSPITAL | Age: 25
End: 2025-09-12
Payer: MEDICAID

## 2026-01-21 ENCOUNTER — APPOINTMENT (OUTPATIENT)
Dept: DERMATOLOGY | Facility: CLINIC | Age: 26
End: 2026-01-21
Payer: MEDICAID